# Patient Record
Sex: FEMALE | Race: WHITE | Employment: FULL TIME | URBAN - METROPOLITAN AREA
[De-identification: names, ages, dates, MRNs, and addresses within clinical notes are randomized per-mention and may not be internally consistent; named-entity substitution may affect disease eponyms.]

---

## 2017-03-20 ENCOUNTER — TELEPHONE (OUTPATIENT)
Dept: OBGYN CLINIC | Facility: CLINIC | Age: 32
End: 2017-03-20

## 2017-03-20 ENCOUNTER — APPOINTMENT (OUTPATIENT)
Dept: LAB | Age: 32
End: 2017-03-20
Attending: OBSTETRICS & GYNECOLOGY
Payer: COMMERCIAL

## 2017-03-20 DIAGNOSIS — N91.2 AMENORRHEA: Primary | ICD-10-CM

## 2017-03-20 DIAGNOSIS — N91.2 AMENORRHEA: ICD-10-CM

## 2017-03-20 LAB — HCG QUANTITATIVE: <1 MIU/ML (ref ?–1)

## 2017-03-20 PROCEDURE — 84702 CHORIONIC GONADOTROPIN TEST: CPT

## 2017-03-20 PROCEDURE — 36415 COLL VENOUS BLD VENIPUNCTURE: CPT

## 2017-03-20 NOTE — TELEPHONE ENCOUNTER
Emailed with Romelia Godoy last week. Still hasn't gotten her period. Is always very regular, will be 2 weeks late tomorrow. Was told to call the office today may need to have blood drawn. Took another pregnancy test on Saturday which was negative.

## 2017-03-20 NOTE — TELEPHONE ENCOUNTER
HCG ordered  Patient notified to schedule a NOB appointment if positive or a gyne visit if negative.   Please hold for HCG results

## 2017-03-21 NOTE — PROGRESS NOTES
Quick Note:    Please notify patient her HCG is negative, she is not pregnancy. Await menses in the next few weeks.   ______

## 2017-03-27 ENCOUNTER — APPOINTMENT (OUTPATIENT)
Dept: LAB | Age: 32
End: 2017-03-27
Attending: NURSE PRACTITIONER
Payer: COMMERCIAL

## 2017-03-27 ENCOUNTER — OFFICE VISIT (OUTPATIENT)
Dept: OBGYN CLINIC | Facility: CLINIC | Age: 32
End: 2017-03-27

## 2017-03-27 VITALS
HEIGHT: 66 IN | WEIGHT: 240 LBS | HEART RATE: 112 BPM | SYSTOLIC BLOOD PRESSURE: 140 MMHG | DIASTOLIC BLOOD PRESSURE: 100 MMHG | BODY MASS INDEX: 38.57 KG/M2

## 2017-03-27 DIAGNOSIS — N92.6 IRREGULAR MENSES: ICD-10-CM

## 2017-03-27 DIAGNOSIS — N92.6 IRREGULAR MENSES: Primary | ICD-10-CM

## 2017-03-27 LAB
CONTROL LINE PRESENT WITH A CLEAR BACKGROUND (YES/NO): YES YES/NO
FREE T4: 0.9 NG/DL (ref 0.9–1.8)
THYROXINE (T4): 9.7 UG/DL (ref 4.5–10.9)

## 2017-03-27 PROCEDURE — 84439 ASSAY OF FREE THYROXINE: CPT

## 2017-03-27 PROCEDURE — 81025 URINE PREGNANCY TEST: CPT | Performed by: NURSE PRACTITIONER

## 2017-03-27 PROCEDURE — 36415 COLL VENOUS BLD VENIPUNCTURE: CPT

## 2017-03-27 PROCEDURE — 99213 OFFICE O/P EST LOW 20 MIN: CPT | Performed by: NURSE PRACTITIONER

## 2017-03-27 RX ORDER — MEDROXYPROGESTERONE ACETATE 10 MG/1
10 TABLET ORAL DAILY
Qty: 5 TABLET | Refills: 0 | Status: SHIPPED | OUTPATIENT
Start: 2017-03-27 | End: 2017-04-01

## 2017-03-27 NOTE — PROGRESS NOTES
S:  Patient attempting conceptions since D/C OCP in 7/2016. Menses have been monthly until 2/7/17 LMP. Has taken multiple urine pregnancy tests all negative. Blood pregnancy test was negative. Having mild cramping, bloating currently.     O:  Vagina pink, m

## 2017-03-28 ENCOUNTER — TELEPHONE (OUTPATIENT)
Dept: OBGYN CLINIC | Facility: CLINIC | Age: 32
End: 2017-03-28

## 2017-03-28 RX ORDER — MEDROXYPROGESTERONE ACETATE 10 MG/1
TABLET ORAL
Qty: 90 TABLET | Refills: 0 | OUTPATIENT
Start: 2017-03-28

## 2017-05-26 ENCOUNTER — OFFICE VISIT (OUTPATIENT)
Dept: FAMILY MEDICINE CLINIC | Facility: CLINIC | Age: 32
End: 2017-05-26

## 2017-05-26 VITALS
SYSTOLIC BLOOD PRESSURE: 132 MMHG | HEART RATE: 108 BPM | BODY MASS INDEX: 39 KG/M2 | DIASTOLIC BLOOD PRESSURE: 84 MMHG | RESPIRATION RATE: 24 BRPM | TEMPERATURE: 98 F | WEIGHT: 243.63 LBS

## 2017-05-26 DIAGNOSIS — I10 ESSENTIAL HYPERTENSION: Primary | ICD-10-CM

## 2017-05-26 PROCEDURE — 99214 OFFICE O/P EST MOD 30 MIN: CPT | Performed by: FAMILY MEDICINE

## 2017-05-26 RX ORDER — METHYLDOPA 500 MG/1
500 TABLET, FILM COATED ORAL 2 TIMES DAILY
Qty: 60 TABLET | Refills: 0 | Status: SHIPPED | OUTPATIENT
Start: 2017-05-26 | End: 2017-07-30

## 2017-05-26 NOTE — PROGRESS NOTES
Haleigh Hatch is a 36year old female. Patient presents with:  Blood Pressure: per pt, follow up on BP      HPI:   A lot has happened since I saw her 6 months ago: Bought a new house. Still trying to get pregnant. Had a very stressful day today.  She 24  Wt 243 lb 9.6 oz  LMP 05/26/2017 Body mass index is 39.34 kg/(m^2).       GENERAL: well developed, well nourished,in no apparent distress  SKIN: no rashes,no suspicious lesions  HEENT: atraumatic, normocephalic,ears and throat are clear  NECK: supple,no

## 2017-05-27 RX ORDER — METHYLDOPA 500 MG/1
TABLET, FILM COATED ORAL
Qty: 180 TABLET | Refills: 0 | OUTPATIENT
Start: 2017-05-27

## 2017-06-09 ENCOUNTER — NURSE ONLY (OUTPATIENT)
Dept: FAMILY MEDICINE CLINIC | Facility: CLINIC | Age: 32
End: 2017-06-09

## 2017-06-09 VITALS — SYSTOLIC BLOOD PRESSURE: 122 MMHG | DIASTOLIC BLOOD PRESSURE: 88 MMHG

## 2017-06-09 NOTE — PROGRESS NOTES
Patient to clinic for BP check. Confirms she take Methyldopa 500 mg BID. Last took today at 0630. /96 left arm large cuff.     Patient states she just came her from work and she has \"white coat syndrome\"  Reports readings at home as follows:

## 2017-06-09 NOTE — PROGRESS NOTES
Lets leave her on the same dose and watch it for now. The top number is better, the bottom number is still a little high. Overall, however, it is better. Follow up with me in 3-4 months.

## 2017-06-09 NOTE — PROGRESS NOTES
Recheck /88    Advised office would call Monday with any further recommendations.   Patient states she will need refill sent to pharmacy

## 2017-06-29 ENCOUNTER — TELEPHONE (OUTPATIENT)
Dept: OBGYN CLINIC | Facility: CLINIC | Age: 32
End: 2017-06-29

## 2017-06-29 NOTE — TELEPHONE ENCOUNTER
Patient was just seen March 2017. She took Provera and got her period 2 weeks later. She has not had a menses since April. She feels as though she is going to get her menses but never does. She stopped her OCP one year ago.    Before April of this year

## 2017-06-29 NOTE — TELEPHONE ENCOUNTER
Pt called stating she has not had a period since April  Pregnancy tests negative  Saw NP in March for same problem - had 2 periods and not since

## 2017-07-08 ENCOUNTER — OFFICE VISIT (OUTPATIENT)
Dept: OBGYN CLINIC | Facility: CLINIC | Age: 32
End: 2017-07-08

## 2017-07-08 VITALS
WEIGHT: 244 LBS | SYSTOLIC BLOOD PRESSURE: 132 MMHG | HEART RATE: 107 BPM | HEIGHT: 67 IN | BODY MASS INDEX: 38.3 KG/M2 | DIASTOLIC BLOOD PRESSURE: 82 MMHG

## 2017-07-08 DIAGNOSIS — N92.6 IRREGULAR MENSES: ICD-10-CM

## 2017-07-08 DIAGNOSIS — Z32.02 URINE PREGNANCY TEST NEGATIVE: Primary | ICD-10-CM

## 2017-07-08 LAB
CONTROL LINE PRESENT WITH A CLEAR BACKGROUND (YES/NO): YES YES/NO
PREGNANCY TEST, URINE: NEGATIVE

## 2017-07-08 PROCEDURE — 81025 URINE PREGNANCY TEST: CPT | Performed by: NURSE PRACTITIONER

## 2017-07-08 PROCEDURE — 99213 OFFICE O/P EST LOW 20 MIN: CPT | Performed by: NURSE PRACTITIONER

## 2017-07-08 RX ORDER — MEDROXYPROGESTERONE ACETATE 10 MG/1
10 TABLET ORAL DAILY
Qty: 5 TABLET | Refills: 0 | Status: SHIPPED | OUTPATIENT
Start: 2017-07-08 | End: 2017-07-13

## 2017-07-08 RX ORDER — DROSPIRENONE AND ETHINYL ESTRADIOL 0.03MG-3MG
KIT ORAL
COMMUNITY
Start: 2014-02-14 | End: 2017-07-08

## 2017-07-08 NOTE — PROGRESS NOTES
Gyne note       S: patient is a 36year old yo  here for continued irregularities with menses. Normal monthly cycles after d/c OCP 2016. Desires conception. Last normal menses induced 2017 with Provera. Spouse present for visit.     Review of Sys multiples.

## 2017-07-10 RX ORDER — MEDROXYPROGESTERONE ACETATE 10 MG/1
TABLET ORAL
Qty: 90 TABLET | Refills: 0 | OUTPATIENT
Start: 2017-07-10

## 2017-07-20 ENCOUNTER — PATIENT MESSAGE (OUTPATIENT)
Dept: OBGYN CLINIC | Facility: CLINIC | Age: 32
End: 2017-07-20

## 2017-07-20 NOTE — TELEPHONE ENCOUNTER
From: Saad Crawford  To: YANIV Contreras  Sent: 7/20/2017 5:52 AM CDT  Subject: Non-Urgent Medical Question    Freda Briceño,    I am still waiting to get my period since stopping the provera. I've been off of it a week now.      If I were to get

## 2017-07-20 NOTE — TELEPHONE ENCOUNTER
Advise patient to call if no menses in the next week. If day 3 lands on a Sunday I do not believe lab is open at all so then I would defer labs to Monday.

## 2017-07-24 ENCOUNTER — APPOINTMENT (OUTPATIENT)
Dept: LAB | Facility: HOSPITAL | Age: 32
End: 2017-07-24
Attending: NURSE PRACTITIONER
Payer: COMMERCIAL

## 2017-07-24 DIAGNOSIS — N92.6 IRREGULAR MENSES: ICD-10-CM

## 2017-07-24 LAB
ESTRADIOL: 251.7 PG/ML
FSH: 4.3 MIU/ML
GLUCOSE BLD-MCNC: 94 MG/DL (ref 70–99)
INSULIN: 14.3 MU/L (ref 1.7–31)
LH: 1.3 MIU/ML
PROLACTIN: 10 NG/ML
TSI SER-ACNC: 2.16 MIU/ML (ref 0.35–5.5)

## 2017-07-24 PROCEDURE — 82947 ASSAY GLUCOSE BLOOD QUANT: CPT

## 2017-07-24 PROCEDURE — 84443 ASSAY THYROID STIM HORMONE: CPT

## 2017-07-24 PROCEDURE — 84403 ASSAY OF TOTAL TESTOSTERONE: CPT

## 2017-07-24 PROCEDURE — 84402 ASSAY OF FREE TESTOSTERONE: CPT

## 2017-07-24 PROCEDURE — 36415 COLL VENOUS BLD VENIPUNCTURE: CPT

## 2017-07-24 PROCEDURE — 83001 ASSAY OF GONADOTROPIN (FSH): CPT

## 2017-07-24 PROCEDURE — 83525 ASSAY OF INSULIN: CPT

## 2017-07-24 PROCEDURE — 82670 ASSAY OF TOTAL ESTRADIOL: CPT

## 2017-07-24 PROCEDURE — 83002 ASSAY OF GONADOTROPIN (LH): CPT

## 2017-07-24 PROCEDURE — 84146 ASSAY OF PROLACTIN: CPT

## 2017-07-26 LAB
SEX HORMONE BINDING GLOBULIN: 119 NMOL/L
TESTOSTERONE -MS, BIOAVAILAB: 4.3 NG/DL
TESTOSTERONE, -MS/MS: 22 NG/DL
TESTOSTERONE, FREE -MS/MS: 1.5 PG/ML

## 2017-07-27 ENCOUNTER — PATIENT MESSAGE (OUTPATIENT)
Dept: OBGYN CLINIC | Facility: CLINIC | Age: 32
End: 2017-07-27

## 2017-07-27 NOTE — TELEPHONE ENCOUNTER
Have patients spouse call his PCP for an order. He can go to any [facility] in the area or urology. Yes that would be the next step.

## 2017-07-27 NOTE — TELEPHONE ENCOUNTER
From: Mónica Toledo  To: YANIV Cai  Sent: 7/27/2017 3:38 PM CDT  Subject: Non-Urgent Medical Question    Stefan Mora,    At our appointment we discussed Kenney Joshua possibly having to have his sperm tested.  With my labs coming back normal, is t

## 2017-07-30 DIAGNOSIS — I10 ESSENTIAL HYPERTENSION: ICD-10-CM

## 2017-07-31 DIAGNOSIS — I10 ESSENTIAL HYPERTENSION: ICD-10-CM

## 2017-07-31 RX ORDER — METHYLDOPA 500 MG/1
TABLET, FILM COATED ORAL
Qty: 180 TABLET | Refills: 0 | Status: SHIPPED | OUTPATIENT
Start: 2017-07-31 | End: 2018-08-11

## 2017-07-31 RX ORDER — METHYLDOPA 500 MG/1
TABLET, FILM COATED ORAL
Qty: 60 TABLET | Refills: 0 | Status: SHIPPED | OUTPATIENT
Start: 2017-07-31 | End: 2017-07-31

## 2017-07-31 NOTE — TELEPHONE ENCOUNTER
Last OV 5/26/17 7/8/17 /82  Last refilled 5/26/17  #60  0 refills    Routed to Dr Alok Muñoz as covering provider

## 2017-08-01 ENCOUNTER — HOSPITAL ENCOUNTER (OUTPATIENT)
Dept: ULTRASOUND IMAGING | Age: 32
Discharge: HOME | End: 2017-08-01
Attending: NURSE PRACTITIONER
Payer: COMMERCIAL

## 2017-08-01 DIAGNOSIS — N92.6 IRREGULAR MENSES: ICD-10-CM

## 2017-08-01 PROCEDURE — 76856 US EXAM PELVIC COMPLETE: CPT | Performed by: NURSE PRACTITIONER

## 2017-08-01 PROCEDURE — 76830 TRANSVAGINAL US NON-OB: CPT | Performed by: NURSE PRACTITIONER

## 2017-08-02 DIAGNOSIS — N83.202 BILATERAL OVARIAN CYSTS: Primary | ICD-10-CM

## 2017-08-02 DIAGNOSIS — N83.201 BILATERAL OVARIAN CYSTS: Primary | ICD-10-CM

## 2017-11-20 ENCOUNTER — OFFICE VISIT (OUTPATIENT)
Dept: OBGYN CLINIC | Facility: CLINIC | Age: 32
End: 2017-11-20

## 2017-11-20 VITALS
HEART RATE: 106 BPM | DIASTOLIC BLOOD PRESSURE: 84 MMHG | BODY MASS INDEX: 40.17 KG/M2 | SYSTOLIC BLOOD PRESSURE: 136 MMHG | WEIGHT: 247 LBS | HEIGHT: 65.75 IN

## 2017-11-20 DIAGNOSIS — N91.2 AMENORRHEA: ICD-10-CM

## 2017-11-20 DIAGNOSIS — Z32.02 URINE PREGNANCY TEST NEGATIVE: Primary | ICD-10-CM

## 2017-11-20 DIAGNOSIS — Z12.4 CERVICAL CANCER SCREENING: ICD-10-CM

## 2017-11-20 DIAGNOSIS — Z01.419 WELL WOMAN EXAM WITH ROUTINE GYNECOLOGICAL EXAM: ICD-10-CM

## 2017-11-20 PROCEDURE — 87624 HPV HI-RISK TYP POOLED RSLT: CPT | Performed by: NURSE PRACTITIONER

## 2017-11-20 PROCEDURE — 88175 CYTOPATH C/V AUTO FLUID REDO: CPT | Performed by: NURSE PRACTITIONER

## 2017-11-20 PROCEDURE — 81025 URINE PREGNANCY TEST: CPT | Performed by: NURSE PRACTITIONER

## 2017-11-20 PROCEDURE — 99395 PREV VISIT EST AGE 18-39: CPT | Performed by: NURSE PRACTITIONER

## 2017-11-20 RX ORDER — MEDROXYPROGESTERONE ACETATE 10 MG/1
10 TABLET ORAL DAILY
Qty: 5 TABLET | Refills: 0 | Status: SHIPPED | OUTPATIENT
Start: 2017-11-20 | End: 2017-11-25

## 2017-11-20 NOTE — PROGRESS NOTES
Here for Routine Annual Exam  No menses since last one induced with Provera 7/2017. The one prior was also induced 4/2017. Desires pregnancy, has bilateral ovarian cysts. Denies any pain.   No C/O    ROS: No Cardiac, Respiratory, GI,  or Neurological symp

## 2017-11-26 DIAGNOSIS — I10 ESSENTIAL HYPERTENSION: ICD-10-CM

## 2017-11-27 DIAGNOSIS — I10 ESSENTIAL HYPERTENSION: ICD-10-CM

## 2017-11-27 RX ORDER — METHYLDOPA 500 MG/1
TABLET, FILM COATED ORAL
Qty: 180 TABLET | Refills: 0 | Status: SHIPPED | OUTPATIENT
Start: 2017-11-27 | End: 2017-12-15

## 2017-11-27 RX ORDER — METHYLDOPA 500 MG/1
TABLET, FILM COATED ORAL
Qty: 60 TABLET | Refills: 0 | Status: SHIPPED | OUTPATIENT
Start: 2017-11-27 | End: 2017-11-27

## 2017-11-27 NOTE — TELEPHONE ENCOUNTER
LOV: 5/26/17  Last Refill: 7/31/17  #180  0 refills    No future appointments.     Carol Lopez, 11/27/17, 11:39 AM

## 2017-12-15 ENCOUNTER — TELEPHONE (OUTPATIENT)
Dept: OBGYN CLINIC | Facility: CLINIC | Age: 32
End: 2017-12-15

## 2017-12-15 ENCOUNTER — OFFICE VISIT (OUTPATIENT)
Dept: FAMILY MEDICINE CLINIC | Facility: CLINIC | Age: 32
End: 2017-12-15

## 2017-12-15 VITALS
HEART RATE: 78 BPM | WEIGHT: 248 LBS | HEIGHT: 66 IN | DIASTOLIC BLOOD PRESSURE: 84 MMHG | SYSTOLIC BLOOD PRESSURE: 126 MMHG | TEMPERATURE: 99 F | BODY MASS INDEX: 39.86 KG/M2

## 2017-12-15 DIAGNOSIS — E28.2 PCOS (POLYCYSTIC OVARIAN SYNDROME): ICD-10-CM

## 2017-12-15 DIAGNOSIS — Z00.00 HEALTHY ADULT ON ROUTINE PHYSICAL EXAMINATION: Primary | ICD-10-CM

## 2017-12-15 DIAGNOSIS — Z00.00 PREVENTATIVE HEALTH CARE: ICD-10-CM

## 2017-12-15 PROCEDURE — 85025 COMPLETE CBC W/AUTO DIFF WBC: CPT | Performed by: FAMILY MEDICINE

## 2017-12-15 PROCEDURE — 80061 LIPID PANEL: CPT | Performed by: FAMILY MEDICINE

## 2017-12-15 PROCEDURE — 36415 COLL VENOUS BLD VENIPUNCTURE: CPT | Performed by: FAMILY MEDICINE

## 2017-12-15 PROCEDURE — 99395 PREV VISIT EST AGE 18-39: CPT | Performed by: FAMILY MEDICINE

## 2017-12-15 PROCEDURE — 80053 COMPREHEN METABOLIC PANEL: CPT | Performed by: FAMILY MEDICINE

## 2017-12-15 NOTE — PROGRESS NOTES
HPI:   William Casiano is a 29year old female who presents for a complete physical exam. Symptoms: periods are irregular . Patient complains of nothing new today. Being treated for PSOS, has a pelvic US this afternoon.  Considering clomid after that Grandmother    • Cancer Maternal Grandmother      colon   • Heart Disorder Maternal Grandmother    • Other [OTHER] Maternal Grandmother      copd/kidney disease   • Diabetes Paternal Grandfather    • Other Will Cra Sister      brain tumor   • Other Will Cra tenderness  LUNGS: clear to auscultation  CARDIO: RRR without murmur  GI: good BS's,no masses, HSM or tenderness  : deferred   MUSCULOSKELETAL: back is not tender,FROM of the back  EXTREMITIES: no cyanosis, clubbing or edema  NEURO: Oriented times three,

## 2017-12-18 NOTE — TELEPHONE ENCOUNTER
Spoke with patient. Reported results and instructed to f/u with MD in office to discuss plan of care. She states understanding and will call back to schedule appt.

## 2017-12-18 NOTE — TELEPHONE ENCOUNTER
Patients US received. Some changes to the ovaries are noted, unsure if cysts are new or just changed from the prior studies. Fibroid appears stable.  US is as follows:    Uterus 9.4 x 3.9 x 6.0 cm, 2.6 x 2.6 x 2.7 cm intramural fibroid within the left uteri

## 2017-12-19 ENCOUNTER — MED REC SCAN ONLY (OUTPATIENT)
Dept: OBGYN CLINIC | Facility: CLINIC | Age: 32
End: 2017-12-19

## 2018-01-09 ENCOUNTER — OFFICE VISIT (OUTPATIENT)
Dept: OBGYN CLINIC | Facility: CLINIC | Age: 33
End: 2018-01-09

## 2018-01-09 VITALS
SYSTOLIC BLOOD PRESSURE: 132 MMHG | WEIGHT: 250 LBS | HEIGHT: 66 IN | BODY MASS INDEX: 40.18 KG/M2 | DIASTOLIC BLOOD PRESSURE: 76 MMHG

## 2018-01-09 DIAGNOSIS — N97.9 FEMALE FERTILITY PROBLEM: Primary | ICD-10-CM

## 2018-01-09 PROCEDURE — 99212 OFFICE O/P EST SF 10 MIN: CPT | Performed by: OBSTETRICS & GYNECOLOGY

## 2018-01-09 NOTE — PROGRESS NOTES
[facility]  Obstetrics and Gynecology  Follow Up Progress Note    Subjective:     April Singh is a 29year old [place] female who was last seen in office on 11/20/17 with c/o fertility concerns.  The patient was recommended to return for fo

## 2018-01-09 NOTE — PATIENT INSTRUCTIONS
Please return in one year for your annual gyne visit or sooner if having abnormal vaginal bleeding or severe pelvic pain      Reproductive Endocrinology and Infertility Specialist ([facility]):    1) Dr. Catracho Griffith, IVF1   [facility] [address], [address] [address]

## 2018-01-10 PROBLEM — E28.2 PCOS (POLYCYSTIC OVARIAN SYNDROME): Status: RESOLVED | Noted: 2017-12-15 | Resolved: 2018-01-10

## 2018-01-10 PROBLEM — N97.9 FEMALE FERTILITY PROBLEM: Status: ACTIVE | Noted: 2018-01-10

## 2018-01-18 ENCOUNTER — MED REC SCAN ONLY (OUTPATIENT)
Dept: OBGYN CLINIC | Facility: CLINIC | Age: 33
End: 2018-01-18

## 2018-02-22 ENCOUNTER — PATIENT MESSAGE (OUTPATIENT)
Dept: OBGYN CLINIC | Facility: CLINIC | Age: 33
End: 2018-02-22

## 2018-02-22 NOTE — TELEPHONE ENCOUNTER
From: Jennifer November  To: YANIV Ortega  Sent: 2/22/2018 6:51 AM CST  Subject: Shady Deras,    I have my appointment with Dr. Gonzales Emery Saturday and need to obtain my test results, for blood work and ultrasounds, that we have done.

## 2018-03-07 DIAGNOSIS — I10 ESSENTIAL HYPERTENSION: ICD-10-CM

## 2018-03-07 NOTE — TELEPHONE ENCOUNTER
Last refilled on 11/27/17 for # 60 with 0 rf. Last seen on 12/15/17. /76 on 1/9/18. No future appointments. Thank you.

## 2018-03-08 RX ORDER — METHYLDOPA 500 MG/1
TABLET, FILM COATED ORAL
Qty: 180 TABLET | Refills: 0 | Status: SHIPPED | OUTPATIENT
Start: 2018-03-08 | End: 2018-08-10

## 2018-08-10 DIAGNOSIS — I10 ESSENTIAL HYPERTENSION: ICD-10-CM

## 2018-08-11 RX ORDER — METHYLDOPA 500 MG/1
TABLET, FILM COATED ORAL
Qty: 180 TABLET | Refills: 0 | Status: SHIPPED | OUTPATIENT
Start: 2018-08-11 | End: 2018-09-17

## 2018-08-11 NOTE — TELEPHONE ENCOUNTER
LOV: 12/15/17  Last Refill: 3/8/18 #180 0 RF    Future Appointments  Date Time Provider Eusebio Merida   9/17/2018 11:00 AM DO HALEIGH Kaur [facility]

## 2018-09-17 ENCOUNTER — OFFICE VISIT (OUTPATIENT)
Dept: FAMILY MEDICINE CLINIC | Facility: CLINIC | Age: 33
End: 2018-09-17
Payer: COMMERCIAL

## 2018-09-17 VITALS
SYSTOLIC BLOOD PRESSURE: 134 MMHG | HEART RATE: 83 BPM | OXYGEN SATURATION: 97 % | DIASTOLIC BLOOD PRESSURE: 86 MMHG | BODY MASS INDEX: 38.89 KG/M2 | WEIGHT: 242 LBS | HEIGHT: 66 IN

## 2018-09-17 DIAGNOSIS — N97.9 FEMALE FERTILITY PROBLEM: Primary | ICD-10-CM

## 2018-09-17 DIAGNOSIS — I10 ESSENTIAL HYPERTENSION: ICD-10-CM

## 2018-09-17 PROBLEM — E28.2 PCOS (POLYCYSTIC OVARIAN SYNDROME): Status: ACTIVE | Noted: 2018-02-17

## 2018-09-17 PROCEDURE — 99214 OFFICE O/P EST MOD 30 MIN: CPT | Performed by: FAMILY MEDICINE

## 2018-09-17 RX ORDER — METHYLDOPA 500 MG/1
500 TABLET, FILM COATED ORAL 3 TIMES DAILY
Qty: 90 TABLET | Refills: 2 | Status: SHIPPED | OUTPATIENT
Start: 2018-09-17 | End: 2019-01-10

## 2018-09-17 RX ORDER — METFORMIN HYDROCHLORIDE 500 MG/1
1000 TABLET, EXTENDED RELEASE ORAL 2 TIMES DAILY
Refills: 3 | COMMUNITY
Start: 2018-08-24 | End: 2019-02-13 | Stop reason: ALTCHOICE

## 2018-09-17 NOTE — PROGRESS NOTES
Halima Bah is a 29year old female. Patient presents with:  Blood Pressure      HPI:   Infertility: She is starting IVF in October. Start OCP today to hold her cycles, will start next IVF cycles. Switched jobs as well, still at [employer].   D arm, Patient Position: Sitting, Cuff Size: large)   Pulse 83   Ht 66\"   Wt 242 lb   LMP 09/16/2018   SpO2 97%   BMI 39.06 kg/m²  Body mass index is 39.06 kg/m².       GENERAL: well developed, well nourished,in no apparent distress  SKIN: no rashes,no suspi

## 2018-10-17 ENCOUNTER — NURSE ONLY (OUTPATIENT)
Dept: FAMILY MEDICINE CLINIC | Facility: CLINIC | Age: 33
End: 2018-10-17
Payer: COMMERCIAL

## 2018-10-17 VITALS — SYSTOLIC BLOOD PRESSURE: 120 MMHG | DIASTOLIC BLOOD PRESSURE: 88 MMHG

## 2018-10-17 DIAGNOSIS — Z01.812 PRE-OPERATIVE LABORATORY EXAMINATION: Primary | ICD-10-CM

## 2018-10-17 PROCEDURE — 93000 ELECTROCARDIOGRAM COMPLETE: CPT | Performed by: FAMILY MEDICINE

## 2018-10-17 NOTE — PROGRESS NOTES
Patient presents today for BP check and EKG ordered by Dr Ruddy Masterson. Patient brought orders with her. Entered into Epic. /88. EKG completed and checked by Dr Elif Patel.  Please fax EKG to Dr [facility] at [phone]  Patient left office in stable cond

## 2018-10-19 NOTE — PROGRESS NOTES
Pre-operative laboratory examination  (primary encounter diagnosis)    No orders of the defined types were placed in this encounter.       Meds & Refills for this Visit:  Requested Prescriptions      No prescriptions requested or ordered in this encounter

## 2018-12-13 ENCOUNTER — TELEPHONE (OUTPATIENT)
Dept: FAMILY MEDICINE CLINIC | Facility: CLINIC | Age: 33
End: 2018-12-13

## 2018-12-19 ENCOUNTER — TELEPHONE (OUTPATIENT)
Dept: OBGYN CLINIC | Facility: CLINIC | Age: 33
End: 2018-12-19

## 2018-12-19 NOTE — TELEPHONE ENCOUNTER
Patient called to schedule first appt.  She is currently [id number], IVF. Meds: Methyldopa, Metformin, Baby Aspirin, Progesterone, Estrogen, Fish oil. PMH: PCOS, HTN  PSH: Lap Band   Denies any concerns.  Patient wanted to be seen the week of

## 2018-12-19 NOTE — TELEPHONE ENCOUNTER
PT is established patient with [facility]  PT is currently with Dr. Sha Esquivel IVF    Patient calling to schedule NOB  LMP PT knows she is 5 wks and 2 days  Insurance [employer] PPO  Good time to return phone call anytime

## 2019-01-07 ENCOUNTER — TELEPHONE (OUTPATIENT)
Dept: OBGYN CLINIC | Facility: CLINIC | Age: 34
End: 2019-01-07

## 2019-01-07 NOTE — TELEPHONE ENCOUNTER
Received Dr. Rani Dolan IVF Records in [facility] 1615 in Dr. Alex rocha for 1/14/19 appt

## 2019-01-09 ENCOUNTER — TELEPHONE (OUTPATIENT)
Dept: OBGYN CLINIC | Facility: CLINIC | Age: 34
End: 2019-01-09

## 2019-01-09 NOTE — TELEPHONE ENCOUNTER
NOB appt scheduled for 1/14/19. Pt is transfer from [facility] after IVF. Last US done 1/2/19. Pt is very anxious and asking if an US will be done at her first appt.   Pt advised she is not scheduled for a separate US because the pregnancy has been con

## 2019-01-09 NOTE — TELEPHONE ENCOUNTER
PT has questions regarding US and if it will be done at her [address] [place] [address] appointment.      PT was not scheduled for confirmation of pregnancy

## 2019-01-10 DIAGNOSIS — I10 ESSENTIAL HYPERTENSION: ICD-10-CM

## 2019-01-11 RX ORDER — METHYLDOPA 500 MG/1
TABLET, FILM COATED ORAL
Qty: 270 TABLET | Refills: 1 | Status: SHIPPED | OUTPATIENT
Start: 2019-01-11 | End: 2019-02-13 | Stop reason: ALTCHOICE

## 2019-01-16 ENCOUNTER — LAB ENCOUNTER (OUTPATIENT)
Dept: LAB | Age: 34
End: 2019-01-16
Attending: OBSTETRICS & GYNECOLOGY
Payer: COMMERCIAL

## 2019-01-16 ENCOUNTER — INITIAL PRENATAL (OUTPATIENT)
Dept: OBGYN CLINIC | Facility: CLINIC | Age: 34
End: 2019-01-16
Payer: COMMERCIAL

## 2019-01-16 VITALS — DIASTOLIC BLOOD PRESSURE: 82 MMHG | BODY MASS INDEX: 39 KG/M2 | SYSTOLIC BLOOD PRESSURE: 116 MMHG | WEIGHT: 242 LBS

## 2019-01-16 DIAGNOSIS — I10 CHRONIC HYPERTENSION: ICD-10-CM

## 2019-01-16 DIAGNOSIS — O09.91 HIGH-RISK PREGNANCY IN FIRST TRIMESTER: ICD-10-CM

## 2019-01-16 LAB
ANTIBODY SCREEN: POSITIVE
BASOPHILS # BLD AUTO: 0.03 X10(3) UL (ref 0–0.1)
BASOPHILS NFR BLD AUTO: 0.4 %
DAT (C3D): NEGATIVE
DAT (IGG): POSITIVE
EOSINOPHIL # BLD AUTO: 0.15 X10(3) UL (ref 0–0.3)
EOSINOPHIL NFR BLD AUTO: 1.9 %
ERYTHROCYTE [DISTWIDTH] IN BLOOD BY AUTOMATED COUNT: 12.8 % (ref 11.5–16)
HBV SURFACE AG SER-ACNC: <0.1 [IU]/L
HBV SURFACE AG SERPL QL IA: NONREACTIVE
HCT VFR BLD AUTO: 37.9 % (ref 34–50)
HGB BLD-MCNC: 12.9 G/DL (ref 12–16)
IMMATURE GRANULOCYTE COUNT: 0.01 X10(3) UL (ref 0–1)
IMMATURE GRANULOCYTE RATIO %: 0.1 %
LYMPHOCYTES # BLD AUTO: 2.19 X10(3) UL (ref 0.9–4)
LYMPHOCYTES NFR BLD AUTO: 28 %
MCH RBC QN AUTO: 30.3 PG (ref 27–33.2)
MCHC RBC AUTO-ENTMCNC: 34 G/DL (ref 31–37)
MCV RBC AUTO: 89 FL (ref 81–100)
MONOCYTES # BLD AUTO: 0.47 X10(3) UL (ref 0.1–1)
MONOCYTES NFR BLD AUTO: 6 %
NEUTROPHIL ABS PRELIM: 4.98 X10 (3) UL (ref 1.3–6.7)
NEUTROPHILS # BLD AUTO: 4.98 X10(3) UL (ref 1.3–6.7)
NEUTROPHILS NFR BLD AUTO: 63.6 %
PLATELET # BLD AUTO: 260 10(3)UL (ref 150–450)
RBC # BLD AUTO: 4.26 X10(6)UL (ref 3.8–5.1)
RED CELL DISTRIBUTION WIDTH-SD: 41.8 FL (ref 35.1–46.3)
RH BLOOD TYPE: POSITIVE
RUBV IGG SER QL: POSITIVE
RUBV IGG SER-ACNC: >500 IU/ML (ref 10–?)
T PALLIDUM AB SER QL IA: NONREACTIVE
TREATSERUM: 2
WBC # BLD AUTO: 7.8 X10(3) UL (ref 4–13)

## 2019-01-16 PROCEDURE — 86077 PHYS BLOOD BANK SERV XMATCH: CPT | Performed by: OBSTETRICS & GYNECOLOGY

## 2019-01-16 PROCEDURE — 86880 COOMBS TEST DIRECT: CPT | Performed by: OBSTETRICS & GYNECOLOGY

## 2019-01-16 PROCEDURE — 87086 URINE CULTURE/COLONY COUNT: CPT | Performed by: OBSTETRICS & GYNECOLOGY

## 2019-01-16 PROCEDURE — 87340 HEPATITIS B SURFACE AG IA: CPT | Performed by: OBSTETRICS & GYNECOLOGY

## 2019-01-16 PROCEDURE — 86975 RBC SERUM PRETX INCUBJ DRUGS: CPT | Performed by: OBSTETRICS & GYNECOLOGY

## 2019-01-16 PROCEDURE — 86780 TREPONEMA PALLIDUM: CPT | Performed by: OBSTETRICS & GYNECOLOGY

## 2019-01-16 PROCEDURE — 85025 COMPLETE CBC W/AUTO DIFF WBC: CPT | Performed by: OBSTETRICS & GYNECOLOGY

## 2019-01-16 PROCEDURE — 86850 RBC ANTIBODY SCREEN: CPT | Performed by: OBSTETRICS & GYNECOLOGY

## 2019-01-16 PROCEDURE — 87389 HIV-1 AG W/HIV-1&-2 AB AG IA: CPT | Performed by: OBSTETRICS & GYNECOLOGY

## 2019-01-16 PROCEDURE — 86860 RBC ANTIBODY ELUTION: CPT | Performed by: OBSTETRICS & GYNECOLOGY

## 2019-01-16 PROCEDURE — 86900 BLOOD TYPING SEROLOGIC ABO: CPT | Performed by: OBSTETRICS & GYNECOLOGY

## 2019-01-16 PROCEDURE — 36415 COLL VENOUS BLD VENIPUNCTURE: CPT | Performed by: OBSTETRICS & GYNECOLOGY

## 2019-01-16 PROCEDURE — 86870 RBC ANTIBODY IDENTIFICATION: CPT | Performed by: OBSTETRICS & GYNECOLOGY

## 2019-01-16 PROCEDURE — 86901 BLOOD TYPING SEROLOGIC RH(D): CPT | Performed by: OBSTETRICS & GYNECOLOGY

## 2019-01-16 PROCEDURE — 86762 RUBELLA ANTIBODY: CPT | Performed by: OBSTETRICS & GYNECOLOGY

## 2019-01-16 RX ORDER — PROGESTERONE 90 MG/1.125G
GEL VAGINAL
Refills: 2 | COMMUNITY
Start: 2018-12-11 | End: 2019-02-13 | Stop reason: ALTCHOICE

## 2019-01-16 NOTE — PROGRESS NOTES
NOB  IVF Dr. Akbar Child  PMH: PCO, Hypertension(6 years)  PSH:Lap band 2012, lost 50 lbs  Meds: Metformin, MethylDopa (on back order0 She has enough till end of month  Prenatal care and course discussed with patient including ultrasounds, genetic testing optio

## 2019-01-28 ENCOUNTER — OFFICE VISIT (OUTPATIENT)
Dept: PERINATAL CARE | Facility: HOSPITAL | Age: 34
End: 2019-01-28
Attending: OBSTETRICS & GYNECOLOGY
Payer: COMMERCIAL

## 2019-01-28 ENCOUNTER — MED REC SCAN ONLY (OUTPATIENT)
Dept: OBGYN CLINIC | Facility: CLINIC | Age: 34
End: 2019-01-28

## 2019-01-28 VITALS
HEART RATE: 100 BPM | SYSTOLIC BLOOD PRESSURE: 136 MMHG | HEIGHT: 66 IN | DIASTOLIC BLOOD PRESSURE: 87 MMHG | WEIGHT: 242 LBS | BODY MASS INDEX: 38.89 KG/M2

## 2019-01-28 DIAGNOSIS — E28.2 PCOS (POLYCYSTIC OVARIAN SYNDROME): ICD-10-CM

## 2019-01-28 DIAGNOSIS — I10 CHRONIC HYPERTENSION: ICD-10-CM

## 2019-01-28 DIAGNOSIS — E66.01 MORBID OBESITY (HCC): ICD-10-CM

## 2019-01-28 DIAGNOSIS — O09.811 PREGNANCY RESULTING FROM IN VITRO FERTILIZATION IN FIRST TRIMESTER: ICD-10-CM

## 2019-01-28 PROCEDURE — 99243 OFF/OP CNSLTJ NEW/EST LOW 30: CPT | Performed by: OBSTETRICS & GYNECOLOGY

## 2019-01-28 RX ORDER — LABETALOL 200 MG/1
200 TABLET, FILM COATED ORAL 2 TIMES DAILY
Qty: 60 TABLET | Refills: 5 | Status: SHIPPED | OUTPATIENT
Start: 2019-01-28 | End: 2019-08-01

## 2019-02-13 ENCOUNTER — ROUTINE PRENATAL (OUTPATIENT)
Dept: OBGYN CLINIC | Facility: CLINIC | Age: 34
End: 2019-02-13
Payer: COMMERCIAL

## 2019-02-13 VITALS
HEIGHT: 66 IN | WEIGHT: 243 LBS | DIASTOLIC BLOOD PRESSURE: 90 MMHG | SYSTOLIC BLOOD PRESSURE: 130 MMHG | BODY MASS INDEX: 39.05 KG/M2

## 2019-02-13 DIAGNOSIS — O09.91 HIGH-RISK PREGNANCY IN FIRST TRIMESTER: Primary | ICD-10-CM

## 2019-02-13 DIAGNOSIS — I10 CHRONIC HYPERTENSION: ICD-10-CM

## 2019-02-13 NOTE — PROGRESS NOTES
STEVE.   Doing well. No complaints. Denies abdominal/pelvic pain or vaginal bleeding.    Rh + - Warm antibodies with weak titer - will repeat next visit    Genetic screening - declined   Level II US scheduled    RTC in 4 weeks

## 2019-03-11 ENCOUNTER — ROUTINE PRENATAL (OUTPATIENT)
Dept: OBGYN CLINIC | Facility: CLINIC | Age: 34
End: 2019-03-11
Payer: COMMERCIAL

## 2019-03-11 VITALS — BODY MASS INDEX: 40 KG/M2 | WEIGHT: 248 LBS | DIASTOLIC BLOOD PRESSURE: 78 MMHG | SYSTOLIC BLOOD PRESSURE: 124 MMHG

## 2019-03-11 DIAGNOSIS — Z34.92 SECOND TRIMESTER PREGNANCY: Primary | ICD-10-CM

## 2019-03-11 DIAGNOSIS — O16.2 HYPERTENSION AFFECTING PREGNANCY IN SECOND TRIMESTER: ICD-10-CM

## 2019-03-11 NOTE — PROGRESS NOTES
STEVE  Doing well  No complaints.  No LOF/VB/uctx  RH pos  Genetic testing declined, declined afp (first, quad/AFP)  Anatomy Scan level 2 scheduled(18-20weeks)    IVF pregnancy  CHTN on meds, delivery at 38 weeks  Early 1 hr gtt  Needs baseline PIH labs and 2

## 2019-03-16 ENCOUNTER — LAB ENCOUNTER (OUTPATIENT)
Dept: LAB | Age: 34
End: 2019-03-16
Attending: OBSTETRICS & GYNECOLOGY
Payer: COMMERCIAL

## 2019-03-16 DIAGNOSIS — Z34.92 SECOND TRIMESTER PREGNANCY: ICD-10-CM

## 2019-03-16 DIAGNOSIS — O16.2 HYPERTENSION AFFECTING PREGNANCY IN SECOND TRIMESTER: ICD-10-CM

## 2019-03-16 LAB
ALBUMIN SERPL-MCNC: 3 G/DL (ref 3.4–5)
ALBUMIN/GLOB SERPL: 0.7 {RATIO} (ref 1–2)
ALP LIVER SERPL-CCNC: 49 U/L (ref 37–98)
ALT SERPL-CCNC: 16 U/L (ref 13–56)
ANION GAP SERPL CALC-SCNC: 7 MMOL/L (ref 0–18)
ANTIBODY SCREEN: NEGATIVE
AST SERPL-CCNC: 12 U/L (ref 15–37)
BASOPHILS # BLD AUTO: 0.01 X10(3) UL (ref 0–0.2)
BASOPHILS NFR BLD AUTO: 0.1 %
BILIRUB SERPL-MCNC: 0.3 MG/DL (ref 0.1–2)
BUN BLD-MCNC: 5 MG/DL (ref 7–18)
BUN/CREAT SERPL: 7.1 (ref 10–20)
CALCIUM BLD-MCNC: 9.1 MG/DL (ref 8.5–10.1)
CHLORIDE SERPL-SCNC: 105 MMOL/L (ref 98–107)
CO2 SERPL-SCNC: 24 MMOL/L (ref 21–32)
CREAT BLD-MCNC: 0.7 MG/DL (ref 0.55–1.02)
DEPRECATED RDW RBC AUTO: 48.7 FL (ref 35.1–46.3)
EOSINOPHIL # BLD AUTO: 0.1 X10(3) UL (ref 0–0.7)
EOSINOPHIL NFR BLD AUTO: 1.2 %
ERYTHROCYTE [DISTWIDTH] IN BLOOD BY AUTOMATED COUNT: 14.3 % (ref 11–15)
GLOBULIN PLAS-MCNC: 4.1 G/DL (ref 2.8–4.4)
GLUCOSE 1H P GLC SERPL-MCNC: 150 MG/DL
GLUCOSE BLD-MCNC: 149 MG/DL (ref 70–99)
HCT VFR BLD AUTO: 37.9 % (ref 35–48)
HGB BLD-MCNC: 12.6 G/DL (ref 12–16)
IMM GRANULOCYTES # BLD AUTO: 0.02 X10(3) UL (ref 0–1)
IMM GRANULOCYTES NFR BLD: 0.2 %
LYMPHOCYTES # BLD AUTO: 1.6 X10(3) UL (ref 1–4)
LYMPHOCYTES NFR BLD AUTO: 19.2 %
M PROTEIN MFR SERPL ELPH: 7.1 G/DL (ref 6.4–8.2)
MCH RBC QN AUTO: 31.3 PG (ref 26–34)
MCHC RBC AUTO-ENTMCNC: 33.2 G/DL (ref 31–37)
MCV RBC AUTO: 94.3 FL (ref 80–100)
MONOCYTES # BLD AUTO: 0.36 X10(3) UL (ref 0.1–1)
MONOCYTES NFR BLD AUTO: 4.3 %
NEUTROPHILS # BLD AUTO: 6.23 X10 (3) UL (ref 1.5–7.7)
NEUTROPHILS # BLD AUTO: 6.23 X10(3) UL (ref 1.5–7.7)
NEUTROPHILS NFR BLD AUTO: 75 %
OSMOLALITY SERPL CALC.SUM OF ELEC: 282 MOSM/KG (ref 275–295)
PLATELET # BLD AUTO: 228 10(3)UL (ref 150–450)
POTASSIUM SERPL-SCNC: 3.9 MMOL/L (ref 3.5–5.1)
RBC # BLD AUTO: 4.02 X10(6)UL (ref 3.8–5.3)
SODIUM SERPL-SCNC: 136 MMOL/L (ref 136–145)
URATE SERPL-MCNC: 4.4 MG/DL (ref 2.6–6)
WBC # BLD AUTO: 8.3 X10(3) UL (ref 4–11)

## 2019-03-16 PROCEDURE — 85025 COMPLETE CBC W/AUTO DIFF WBC: CPT | Performed by: OBSTETRICS & GYNECOLOGY

## 2019-03-16 PROCEDURE — 36415 COLL VENOUS BLD VENIPUNCTURE: CPT | Performed by: OBSTETRICS & GYNECOLOGY

## 2019-03-16 PROCEDURE — 86850 RBC ANTIBODY SCREEN: CPT | Performed by: OBSTETRICS & GYNECOLOGY

## 2019-03-16 PROCEDURE — 82950 GLUCOSE TEST: CPT | Performed by: OBSTETRICS & GYNECOLOGY

## 2019-03-16 PROCEDURE — 84550 ASSAY OF BLOOD/URIC ACID: CPT | Performed by: OBSTETRICS & GYNECOLOGY

## 2019-03-16 PROCEDURE — 80053 COMPREHEN METABOLIC PANEL: CPT | Performed by: OBSTETRICS & GYNECOLOGY

## 2019-03-18 ENCOUNTER — APPOINTMENT (OUTPATIENT)
Dept: LAB | Facility: HOSPITAL | Age: 34
End: 2019-03-18
Attending: OBSTETRICS & GYNECOLOGY
Payer: COMMERCIAL

## 2019-03-18 LAB
M PROTEIN 24H UR ELPH-MRATE: 147 MG/24 HR (ref ?–149.1)
SPECIMEN VOL UR: 1500 ML

## 2019-03-18 PROCEDURE — 84156 ASSAY OF PROTEIN URINE: CPT | Performed by: OBSTETRICS & GYNECOLOGY

## 2019-03-19 NOTE — PROGRESS NOTES
Patient informed of results. Verbalized understanding. Can you please confirm that patient should do 3 hour gtt now at 18 weeks? Thanks.

## 2019-03-30 ENCOUNTER — LAB ENCOUNTER (OUTPATIENT)
Dept: LAB | Age: 34
End: 2019-03-30
Attending: OBSTETRICS & GYNECOLOGY
Payer: COMMERCIAL

## 2019-03-30 DIAGNOSIS — R73.09 ABNORMAL GTT (GLUCOSE TOLERANCE TEST): ICD-10-CM

## 2019-03-30 PROCEDURE — 36415 COLL VENOUS BLD VENIPUNCTURE: CPT

## 2019-03-30 PROCEDURE — 82952 GTT-ADDED SAMPLES: CPT

## 2019-03-30 PROCEDURE — 82962 GLUCOSE BLOOD TEST: CPT

## 2019-03-30 PROCEDURE — 82951 GLUCOSE TOLERANCE TEST (GTT): CPT

## 2019-04-01 ENCOUNTER — OFFICE VISIT (OUTPATIENT)
Dept: PERINATAL CARE | Facility: HOSPITAL | Age: 34
End: 2019-04-01
Attending: OBSTETRICS & GYNECOLOGY
Payer: COMMERCIAL

## 2019-04-01 VITALS
WEIGHT: 247 LBS | BODY MASS INDEX: 39.7 KG/M2 | HEART RATE: 97 BPM | SYSTOLIC BLOOD PRESSURE: 141 MMHG | HEIGHT: 66 IN | DIASTOLIC BLOOD PRESSURE: 94 MMHG

## 2019-04-01 DIAGNOSIS — O09.812 PREGNANCY RESULTING FROM IN VITRO FERTILIZATION IN SECOND TRIMESTER: ICD-10-CM

## 2019-04-01 DIAGNOSIS — E66.01 MORBID OBESITY (HCC): ICD-10-CM

## 2019-04-01 DIAGNOSIS — I10 CHRONIC HYPERTENSION: ICD-10-CM

## 2019-04-01 DIAGNOSIS — E28.2 PCOS (POLYCYSTIC OVARIAN SYNDROME): ICD-10-CM

## 2019-04-01 PROCEDURE — 99214 OFFICE O/P EST MOD 30 MIN: CPT | Performed by: OBSTETRICS & GYNECOLOGY

## 2019-04-01 PROCEDURE — 76811 OB US DETAILED SNGL FETUS: CPT | Performed by: OBSTETRICS & GYNECOLOGY

## 2019-04-01 NOTE — PROGRESS NOTES
Indication: CHTN.   ____________________________________________________________________________  History: Age: 34 years.  : 1 Para: 0.  ____________________________________________________________________________  Dating:  LMP: 11/10/2018 EDC: 08/ normal.    ____________________________________________________________________________  Maternal Structures:  Cervical length 36.1 mm.  ____________________________________________________________________________  Comments:      Reason for Consult:  Dear that a better diet, weight loss and routine exercise can help her blood pressure for the rest of her life. We discussed the morbidity associated with hypertension including heart disease, strokes and kidney disease.             Conception by IVF is associa participate in the care of your patient. Please do not hesitate to call with any questions or concerns. Total patient time was 25 minutes in evaluation, consultation, and coordination of care.   Greater than 50% of this time was spent in face to face d

## 2019-04-08 ENCOUNTER — ROUTINE PRENATAL (OUTPATIENT)
Dept: OBGYN CLINIC | Facility: CLINIC | Age: 34
End: 2019-04-08
Payer: COMMERCIAL

## 2019-04-08 VITALS — WEIGHT: 250 LBS | DIASTOLIC BLOOD PRESSURE: 78 MMHG | BODY MASS INDEX: 40 KG/M2 | SYSTOLIC BLOOD PRESSURE: 136 MMHG

## 2019-04-08 DIAGNOSIS — I10 CHRONIC HYPERTENSION: ICD-10-CM

## 2019-04-08 DIAGNOSIS — O09.91 HIGH-RISK PREGNANCY IN FIRST TRIMESTER: Primary | ICD-10-CM

## 2019-04-08 NOTE — PROGRESS NOTES
No C/O, has FM  Echo scheduled  Needs second GTT (ordered)  BP doing well on Labetalol  CPM  Delivery by 38 weeks, NST's at 32 weeks  4 weeks

## 2019-04-18 ENCOUNTER — TELEPHONE (OUTPATIENT)
Dept: OBGYN CLINIC | Facility: CLINIC | Age: 34
End: 2019-04-18

## 2019-04-18 NOTE — TELEPHONE ENCOUNTER
Received attending provider statement from the [address] [place] [address]. Please complete and fax. No charge for the one page.      Placed in nursing bin at [facility]

## 2019-04-24 ENCOUNTER — MED REC SCAN ONLY (OUTPATIENT)
Dept: OBGYN CLINIC | Facility: CLINIC | Age: 34
End: 2019-04-24

## 2019-04-29 ENCOUNTER — OFFICE VISIT (OUTPATIENT)
Dept: PERINATAL CARE | Facility: HOSPITAL | Age: 34
End: 2019-04-29
Attending: OBSTETRICS & GYNECOLOGY
Payer: COMMERCIAL

## 2019-04-29 VITALS
WEIGHT: 250 LBS | HEART RATE: 118 BPM | DIASTOLIC BLOOD PRESSURE: 88 MMHG | BODY MASS INDEX: 40 KG/M2 | SYSTOLIC BLOOD PRESSURE: 135 MMHG

## 2019-04-29 DIAGNOSIS — O09.812 PREGNANCY RESULTING FROM IN VITRO FERTILIZATION IN SECOND TRIMESTER: ICD-10-CM

## 2019-04-29 DIAGNOSIS — E28.2 PCOS (POLYCYSTIC OVARIAN SYNDROME): ICD-10-CM

## 2019-04-29 DIAGNOSIS — I10 CHRONIC HYPERTENSION: ICD-10-CM

## 2019-04-29 DIAGNOSIS — E66.01 MORBID OBESITY (HCC): ICD-10-CM

## 2019-04-29 PROCEDURE — 93325 DOPPLER ECHO COLOR FLOW MAPG: CPT | Performed by: OBSTETRICS & GYNECOLOGY

## 2019-04-29 PROCEDURE — 76825 ECHO EXAM OF FETAL HEART: CPT | Performed by: OBSTETRICS & GYNECOLOGY

## 2019-04-29 PROCEDURE — 76827 ECHO EXAM OF FETAL HEART: CPT | Performed by: OBSTETRICS & GYNECOLOGY

## 2019-04-29 PROCEDURE — 99214 OFFICE O/P EST MOD 30 MIN: CPT | Performed by: OBSTETRICS & GYNECOLOGY

## 2019-04-29 NOTE — PROGRESS NOTES
Indication: IVF, CHTN.   ____________________________________________________________________________  History: Age: 34 years. : 1 Para: 0.  Last menstrual period: 11/10/2018.  _______________________________________________________________________ Known Allergies  Current Meds:   Current Outpatient Medications: Labetalol HCl 200 MG Oral Tab Take 1 tablet (200 mg total) by mouth 2 (two) times daily. Disp: 60 tablet Rfl: 5  aspirin 81 MG Oral Tab Take 81 mg by mouth daily. Disp:  Rfl:   PRENATAL 27-0. % so fetal echocardiography is recommended in all IVF patients. Stillbirth and  mortality rates appear to be increased as much as four-fold.        Amparo Bohr ART pregnancies, the relative risk of common pregnancy complications such as fetal growth

## 2019-05-06 ENCOUNTER — ROUTINE PRENATAL (OUTPATIENT)
Dept: OBGYN CLINIC | Facility: CLINIC | Age: 34
End: 2019-05-06
Payer: COMMERCIAL

## 2019-05-06 VITALS — BODY MASS INDEX: 41 KG/M2 | SYSTOLIC BLOOD PRESSURE: 130 MMHG | WEIGHT: 252 LBS | DIASTOLIC BLOOD PRESSURE: 78 MMHG

## 2019-05-06 DIAGNOSIS — O16.2 HYPERTENSION AFFECTING PREGNANCY IN SECOND TRIMESTER: ICD-10-CM

## 2019-05-06 DIAGNOSIS — O09.91 HIGH-RISK PREGNANCY IN FIRST TRIMESTER: Primary | ICD-10-CM

## 2019-05-06 NOTE — PROGRESS NOTES
STEVE  Doing well  FM good  RH positive  Normal fetal echo with MFM. - Weekly NST 32 weeks, monthly growth US in third trimester, Labetalol 200 mg BID, deliver at 38 weeks, continue daily BASA. Failed early 1 hour GTT, passed early 3 hour GTT.  Has another 3

## 2019-05-22 NOTE — PROGRESS NOTES
Smith Lozoya  Dear Dr. Simmons Kingston,     Thank you for requesting ultrasound evaluation and maternal fetal medicine consultation on your patient Bandar Shannon.   As you are aware she is a 34year old female  with a Singleto Ratio 1.118  65th%  FL/AC Ratio 0.227  n/a  BPD/FL Ratio 1.332  19th%  HC/FL Ratio 4.936  43rd%  EFW (lbs/oz) 2 lbs 9 ozs  EFW (g) 1157 g  42nd%     Fetal heart activity: present. Fetal heart rate: 151 bpm.   Fetal presentation: cephalic.    Amniotic fluid: 115-120/70's.       Medical Regimen Recommendation: no change and continue low-dose ASA.     Continued medication use and home blood pressure assessments were reviewed as well as recommendations for serial growth ultrasounds and antepartum testing.     Kyung

## 2019-05-25 ENCOUNTER — LAB ENCOUNTER (OUTPATIENT)
Dept: LAB | Age: 34
End: 2019-05-25
Attending: OBSTETRICS & GYNECOLOGY
Payer: COMMERCIAL

## 2019-05-25 DIAGNOSIS — O09.91 HIGH-RISK PREGNANCY IN FIRST TRIMESTER: ICD-10-CM

## 2019-05-25 PROCEDURE — 36415 COLL VENOUS BLD VENIPUNCTURE: CPT

## 2019-05-25 PROCEDURE — 82952 GTT-ADDED SAMPLES: CPT

## 2019-05-25 PROCEDURE — 82962 GLUCOSE BLOOD TEST: CPT

## 2019-05-25 PROCEDURE — 82951 GLUCOSE TOLERANCE TEST (GTT): CPT

## 2019-05-29 ENCOUNTER — OFFICE VISIT (OUTPATIENT)
Dept: PERINATAL CARE | Facility: HOSPITAL | Age: 34
End: 2019-05-29
Attending: OBSTETRICS & GYNECOLOGY
Payer: COMMERCIAL

## 2019-05-29 VITALS
HEART RATE: 105 BPM | WEIGHT: 252 LBS | SYSTOLIC BLOOD PRESSURE: 132 MMHG | BODY MASS INDEX: 41 KG/M2 | DIASTOLIC BLOOD PRESSURE: 85 MMHG

## 2019-05-29 DIAGNOSIS — I10 CHRONIC HYPERTENSION: ICD-10-CM

## 2019-05-29 DIAGNOSIS — O09.813 PREGNANCY RESULTING FROM ASSISTED REPRODUCTIVE TECHNOLOGY IN THIRD TRIMESTER: ICD-10-CM

## 2019-05-29 DIAGNOSIS — O09.613 HIGH-RISK PREGNANCY, YOUNG PRIMIGRAVIDA IN THIRD TRIMESTER: ICD-10-CM

## 2019-05-29 DIAGNOSIS — E66.01 MORBID OBESITY (HCC): ICD-10-CM

## 2019-05-29 PROCEDURE — 99214 OFFICE O/P EST MOD 30 MIN: CPT | Performed by: OBSTETRICS & GYNECOLOGY

## 2019-05-29 PROCEDURE — 76816 OB US FOLLOW-UP PER FETUS: CPT | Performed by: OBSTETRICS & GYNECOLOGY

## 2019-05-30 ENCOUNTER — ROUTINE PRENATAL (OUTPATIENT)
Dept: OBGYN CLINIC | Facility: CLINIC | Age: 34
End: 2019-05-30
Payer: COMMERCIAL

## 2019-05-30 VITALS — WEIGHT: 256.81 LBS | SYSTOLIC BLOOD PRESSURE: 118 MMHG | BODY MASS INDEX: 41 KG/M2 | DIASTOLIC BLOOD PRESSURE: 76 MMHG

## 2019-05-30 DIAGNOSIS — Z23 NEED FOR VACCINATION: ICD-10-CM

## 2019-05-30 DIAGNOSIS — O09.93 HIGH-RISK PREGNANCY IN THIRD TRIMESTER: Primary | ICD-10-CM

## 2019-05-30 DIAGNOSIS — O16.3 HYPERTENSION AFFECTING PREGNANCY IN THIRD TRIMESTER: ICD-10-CM

## 2019-05-30 PROCEDURE — 90471 IMMUNIZATION ADMIN: CPT | Performed by: NURSE PRACTITIONER

## 2019-05-30 PROCEDURE — 90715 TDAP VACCINE 7 YRS/> IM: CPT | Performed by: NURSE PRACTITIONER

## 2019-05-30 NOTE — PROGRESS NOTES
STEVE  Doing well,  GOOD FM  Denies VB/LOF/uctx  Saw MFM- Monthly growth US, NST weekly 32 weeks, deliver at 38 weeks  TDAP received  HIV ordered  RTC in 2 wks  Fetal movement discussed

## 2019-06-10 ENCOUNTER — ROUTINE PRENATAL (OUTPATIENT)
Dept: OBGYN CLINIC | Facility: CLINIC | Age: 34
End: 2019-06-10
Payer: COMMERCIAL

## 2019-06-10 VITALS — SYSTOLIC BLOOD PRESSURE: 126 MMHG | BODY MASS INDEX: 42 KG/M2 | DIASTOLIC BLOOD PRESSURE: 82 MMHG | WEIGHT: 259 LBS

## 2019-06-10 DIAGNOSIS — O16.3 HYPERTENSION AFFECTING PREGNANCY IN THIRD TRIMESTER: ICD-10-CM

## 2019-06-10 DIAGNOSIS — O09.93 HIGH-RISK PREGNANCY IN THIRD TRIMESTER: Primary | ICD-10-CM

## 2019-06-10 PROCEDURE — 87389 HIV-1 AG W/HIV-1&-2 AB AG IA: CPT | Performed by: NURSE PRACTITIONER

## 2019-06-10 NOTE — PROGRESS NOTES
STEVE  Doing well,  GOOD FM  Denies VB/LOF/uctx  HIV done  Has f/u US with MFM scheduled  RTC in 2 wks with NST  Fetal movement discussed

## 2019-06-24 ENCOUNTER — APPOINTMENT (OUTPATIENT)
Dept: OBGYN CLINIC | Facility: CLINIC | Age: 34
End: 2019-06-24
Payer: COMMERCIAL

## 2019-06-24 ENCOUNTER — ROUTINE PRENATAL (OUTPATIENT)
Dept: OBGYN CLINIC | Facility: CLINIC | Age: 34
End: 2019-06-24
Payer: COMMERCIAL

## 2019-06-24 VITALS — SYSTOLIC BLOOD PRESSURE: 118 MMHG | DIASTOLIC BLOOD PRESSURE: 76 MMHG | BODY MASS INDEX: 42 KG/M2 | WEIGHT: 261 LBS

## 2019-06-24 DIAGNOSIS — O16.3 HYPERTENSION AFFECTING PREGNANCY IN THIRD TRIMESTER: ICD-10-CM

## 2019-06-24 DIAGNOSIS — O09.93 HIGH-RISK PREGNANCY IN THIRD TRIMESTER: Primary | ICD-10-CM

## 2019-06-24 PROCEDURE — 59025 FETAL NON-STRESS TEST: CPT | Performed by: OBSTETRICS & GYNECOLOGY

## 2019-06-24 NOTE — PROGRESS NOTES
Ansley Teixeira  Dear Dr. Cooper Mendoza,     Thank you for requesting ultrasound evaluation and maternal fetal medicine consultation on your patient Devyn Block.  As you are aware she is a 34year Janace Earing a Singleto Ratio 1.017  28th%  FL/AC Ratio 0.204  n/a  BPD/FL Ratio 1.352  31st%  HC/FL Ratio 5.005  65th%  EFW (lbs/oz) 4 lbs 5 ozs  EFW (g) 1946 g  42nd%     Fetal heart activity: present. Fetal heart rate: 135 bpm.   Fetal presentation: cephalic.    Cord: normal. low-dose ASA.     Continued medication use and home blood pressure assessments were reviewed as well as recommendations for serial growth ultrasounds and antepartum testing.     Assisted Reproductive technology -   Conception by IVF is associated with an i

## 2019-06-26 ENCOUNTER — OFFICE VISIT (OUTPATIENT)
Dept: PERINATAL CARE | Facility: HOSPITAL | Age: 34
End: 2019-06-26
Attending: OBSTETRICS & GYNECOLOGY
Payer: COMMERCIAL

## 2019-06-26 VITALS
HEART RATE: 103 BPM | DIASTOLIC BLOOD PRESSURE: 85 MMHG | BODY MASS INDEX: 42 KG/M2 | SYSTOLIC BLOOD PRESSURE: 123 MMHG | WEIGHT: 261 LBS

## 2019-06-26 DIAGNOSIS — E66.01 MORBID OBESITY (HCC): ICD-10-CM

## 2019-06-26 DIAGNOSIS — O16.3 HYPERTENSION AFFECTING PREGNANCY IN THIRD TRIMESTER: ICD-10-CM

## 2019-06-26 DIAGNOSIS — O09.813 PREGNANCY RESULTING FROM ASSISTED REPRODUCTIVE TECHNOLOGY IN THIRD TRIMESTER: ICD-10-CM

## 2019-06-26 DIAGNOSIS — I10 ESSENTIAL HYPERTENSION: ICD-10-CM

## 2019-06-26 PROCEDURE — 76819 FETAL BIOPHYS PROFIL W/O NST: CPT | Performed by: OBSTETRICS & GYNECOLOGY

## 2019-06-26 PROCEDURE — 99213 OFFICE O/P EST LOW 20 MIN: CPT | Performed by: OBSTETRICS & GYNECOLOGY

## 2019-06-26 PROCEDURE — 76816 OB US FOLLOW-UP PER FETUS: CPT | Performed by: OBSTETRICS & GYNECOLOGY

## 2019-06-27 ENCOUNTER — TELEPHONE (OUTPATIENT)
Dept: OBGYN CLINIC | Facility: CLINIC | Age: 34
End: 2019-06-27

## 2019-07-01 ENCOUNTER — APPOINTMENT (OUTPATIENT)
Dept: OBGYN CLINIC | Facility: CLINIC | Age: 34
End: 2019-07-01
Payer: COMMERCIAL

## 2019-07-01 ENCOUNTER — ROUTINE PRENATAL (OUTPATIENT)
Dept: OBGYN CLINIC | Facility: CLINIC | Age: 34
End: 2019-07-01
Payer: COMMERCIAL

## 2019-07-01 VITALS — WEIGHT: 264.63 LBS | DIASTOLIC BLOOD PRESSURE: 70 MMHG | SYSTOLIC BLOOD PRESSURE: 120 MMHG | BODY MASS INDEX: 43 KG/M2

## 2019-07-01 DIAGNOSIS — O16.3 HYPERTENSION AFFECTING PREGNANCY IN THIRD TRIMESTER: ICD-10-CM

## 2019-07-01 DIAGNOSIS — O09.93 HIGH-RISK PREGNANCY IN THIRD TRIMESTER: Primary | ICD-10-CM

## 2019-07-01 PROCEDURE — 59025 FETAL NON-STRESS TEST: CPT | Performed by: OBSTETRICS & GYNECOLOGY

## 2019-07-08 ENCOUNTER — APPOINTMENT (OUTPATIENT)
Dept: OBGYN CLINIC | Facility: CLINIC | Age: 34
End: 2019-07-08
Payer: COMMERCIAL

## 2019-07-08 ENCOUNTER — ROUTINE PRENATAL (OUTPATIENT)
Dept: OBGYN CLINIC | Facility: CLINIC | Age: 34
End: 2019-07-08
Payer: COMMERCIAL

## 2019-07-08 VITALS — SYSTOLIC BLOOD PRESSURE: 142 MMHG | DIASTOLIC BLOOD PRESSURE: 90 MMHG | WEIGHT: 263 LBS | BODY MASS INDEX: 42 KG/M2

## 2019-07-08 DIAGNOSIS — I10 ESSENTIAL HYPERTENSION: ICD-10-CM

## 2019-07-08 DIAGNOSIS — O16.3 HYPERTENSION AFFECTING PREGNANCY IN THIRD TRIMESTER: Primary | ICD-10-CM

## 2019-07-08 PROBLEM — O09.93 HIGH-RISK PREGNANCY IN THIRD TRIMESTER: Status: ACTIVE | Noted: 2019-01-16

## 2019-07-08 PROCEDURE — 59025 FETAL NON-STRESS TEST: CPT | Performed by: OBSTETRICS & GYNECOLOGY

## 2019-07-08 NOTE — PROGRESS NOTES
STEVE  Doing well, +FM  Denies LOF/VB/uctx  Has not taken her Labetalol today. No PIH symptoms.  Bps at home all normal  RTC 1 week    NST reactive and reassuring

## 2019-07-15 ENCOUNTER — APPOINTMENT (OUTPATIENT)
Dept: OBGYN CLINIC | Facility: CLINIC | Age: 34
End: 2019-07-15
Payer: COMMERCIAL

## 2019-07-15 ENCOUNTER — ROUTINE PRENATAL (OUTPATIENT)
Dept: OBGYN CLINIC | Facility: CLINIC | Age: 34
End: 2019-07-15
Payer: COMMERCIAL

## 2019-07-15 VITALS — DIASTOLIC BLOOD PRESSURE: 78 MMHG | SYSTOLIC BLOOD PRESSURE: 140 MMHG | WEIGHT: 267 LBS | BODY MASS INDEX: 43 KG/M2

## 2019-07-15 DIAGNOSIS — O10.919 CHRONIC HYPERTENSION AFFECTING PREGNANCY: Primary | ICD-10-CM

## 2019-07-15 PROCEDURE — 59025 FETAL NON-STRESS TEST: CPT | Performed by: OBSTETRICS & GYNECOLOGY

## 2019-07-15 NOTE — PROGRESS NOTES
STEVE  Doing well, +FM  Denies VB/LOF/uctx  Mode of delivery:   anticipated  SVE deferred   GBS next visit  RTC 1 week  NST for [facility], IVF pregnancy, reactive

## 2019-07-18 ENCOUNTER — EMPLOYEE HEALTH (OUTPATIENT)
Dept: OTHER | Facility: HOSPITAL | Age: 34
End: 2019-07-18
Attending: PREVENTIVE MEDICINE

## 2019-07-18 DIAGNOSIS — A15.0 TB (PULMONARY TUBERCULOSIS): Primary | ICD-10-CM

## 2019-07-18 PROCEDURE — 86480 TB TEST CELL IMMUN MEASURE: CPT

## 2019-07-20 ENCOUNTER — HOSPITAL ENCOUNTER (INPATIENT)
Facility: HOSPITAL | Age: 34
LOS: 3 days | Discharge: HOME | End: 2019-07-23
Attending: OBSTETRICS & GYNECOLOGY | Admitting: OBSTETRICS & GYNECOLOGY
Payer: COMMERCIAL

## 2019-07-20 PROBLEM — Z34.90 PREGNANCY: Status: ACTIVE | Noted: 2019-07-20

## 2019-07-20 LAB
DEPRECATED RDW RBC AUTO: 47.9 FL (ref 35.1–46.3)
ERYTHROCYTE [DISTWIDTH] IN BLOOD BY AUTOMATED COUNT: 14.6 % (ref 11–15)
HCT VFR BLD AUTO: 35.8 % (ref 35–48)
HGB BLD-MCNC: 11.8 G/DL (ref 12–16)
MCH RBC QN AUTO: 29.7 PG (ref 26–34)
MCHC RBC AUTO-ENTMCNC: 33 G/DL (ref 31–37)
MCV RBC AUTO: 90.2 FL (ref 80–100)
PLATELET # BLD AUTO: 257 10(3)UL (ref 150–450)
RBC # BLD AUTO: 3.97 X10(6)UL (ref 3.8–5.3)
WBC # BLD AUTO: 8.1 X10(3) UL (ref 4–11)

## 2019-07-20 RX ORDER — IBUPROFEN 600 MG/1
600 TABLET ORAL ONCE AS NEEDED
Status: DISCONTINUED | OUTPATIENT
Start: 2019-07-20 | End: 2019-07-21

## 2019-07-20 RX ORDER — TRISODIUM CITRATE DIHYDRATE AND CITRIC ACID MONOHYDRATE 500; 334 MG/5ML; MG/5ML
30 SOLUTION ORAL AS NEEDED
Status: DISCONTINUED | OUTPATIENT
Start: 2019-07-20 | End: 2019-07-21

## 2019-07-20 RX ORDER — DEXTROSE, SODIUM CHLORIDE, SODIUM LACTATE, POTASSIUM CHLORIDE, AND CALCIUM CHLORIDE 5; .6; .31; .03; .02 G/100ML; G/100ML; G/100ML; G/100ML; G/100ML
INJECTION, SOLUTION INTRAVENOUS AS NEEDED
Status: DISCONTINUED | OUTPATIENT
Start: 2019-07-20 | End: 2019-07-21

## 2019-07-20 RX ORDER — SODIUM CHLORIDE, SODIUM LACTATE, POTASSIUM CHLORIDE, CALCIUM CHLORIDE 600; 310; 30; 20 MG/100ML; MG/100ML; MG/100ML; MG/100ML
INJECTION, SOLUTION INTRAVENOUS CONTINUOUS
Status: DISCONTINUED | OUTPATIENT
Start: 2019-07-20 | End: 2019-07-21

## 2019-07-20 RX ORDER — AMMONIA INHALANTS 0.04 G/.3ML
0.3 INHALANT RESPIRATORY (INHALATION) AS NEEDED
Status: DISCONTINUED | OUTPATIENT
Start: 2019-07-20 | End: 2019-07-21

## 2019-07-20 RX ORDER — TERBUTALINE SULFATE 1 MG/ML
0.25 INJECTION, SOLUTION SUBCUTANEOUS AS NEEDED
Status: DISCONTINUED | OUTPATIENT
Start: 2019-07-20 | End: 2019-07-21

## 2019-07-21 LAB
ALBUMIN SERPL-MCNC: 2.7 G/DL (ref 3.4–5)
ALBUMIN/GLOB SERPL: 0.6 {RATIO} (ref 1–2)
ALP LIVER SERPL-CCNC: 98 U/L (ref 37–98)
ALT SERPL-CCNC: 31 U/L (ref 13–56)
ANION GAP SERPL CALC-SCNC: 9 MMOL/L (ref 0–18)
ANTIBODY SCREEN: NEGATIVE
AST SERPL-CCNC: 23 U/L (ref 15–37)
BILIRUB SERPL-MCNC: 0.4 MG/DL (ref 0.1–2)
BUN BLD-MCNC: 10 MG/DL (ref 7–18)
BUN/CREAT SERPL: 14.3 (ref 10–20)
CALCIUM BLD-MCNC: 9.4 MG/DL (ref 8.5–10.1)
CHLORIDE SERPL-SCNC: 108 MMOL/L (ref 98–112)
CO2 SERPL-SCNC: 23 MMOL/L (ref 21–32)
CREAT BLD-MCNC: 0.7 MG/DL (ref 0.55–1.02)
GLOBULIN PLAS-MCNC: 4.3 G/DL (ref 2.8–4.4)
GLUCOSE BLD-MCNC: 80 MG/DL (ref 70–99)
M PROTEIN MFR SERPL ELPH: 7 G/DL (ref 6.4–8.2)
OSMOLALITY SERPL CALC.SUM OF ELEC: 288 MOSM/KG (ref 275–295)
POTASSIUM SERPL-SCNC: 4 MMOL/L (ref 3.5–5.1)
RH BLOOD TYPE: POSITIVE
SODIUM SERPL-SCNC: 140 MMOL/L (ref 136–145)
T PALLIDUM AB SER QL IA: NONREACTIVE
URATE SERPL-MCNC: 5.2 MG/DL (ref 2.6–6)

## 2019-07-21 PROCEDURE — 59400 OBSTETRICAL CARE: CPT | Performed by: OBSTETRICS & GYNECOLOGY

## 2019-07-21 RX ORDER — METHYLERGONOVINE MALEATE 0.2 MG/ML
0.2 INJECTION INTRAVENOUS ONCE
Status: COMPLETED | OUTPATIENT
Start: 2019-07-21 | End: 2019-07-21

## 2019-07-21 RX ORDER — ZOLPIDEM TARTRATE 5 MG/1
5 TABLET ORAL NIGHTLY PRN
Status: DISCONTINUED | OUTPATIENT
Start: 2019-07-21 | End: 2019-07-23

## 2019-07-21 RX ORDER — NALBUPHINE HCL 10 MG/ML
2.5 AMPUL (ML) INJECTION
Status: DISCONTINUED | OUTPATIENT
Start: 2019-07-21 | End: 2019-07-21

## 2019-07-21 RX ORDER — METHYLERGONOVINE MALEATE 0.2 MG/ML
INJECTION INTRAVENOUS
Status: DISPENSED
Start: 2019-07-21 | End: 2019-07-21

## 2019-07-21 RX ORDER — ACETAMINOPHEN 325 MG/1
650 TABLET ORAL EVERY 6 HOURS PRN
Status: DISCONTINUED | OUTPATIENT
Start: 2019-07-21 | End: 2019-07-23

## 2019-07-21 RX ORDER — BISACODYL 10 MG
10 SUPPOSITORY, RECTAL RECTAL ONCE AS NEEDED
Status: ACTIVE | OUTPATIENT
Start: 2019-07-21 | End: 2019-07-21

## 2019-07-21 RX ORDER — EPHEDRINE SULFATE/0.9% NACL/PF 25 MG/5 ML
5 SYRINGE (ML) INTRAVENOUS AS NEEDED
Status: DISCONTINUED | OUTPATIENT
Start: 2019-07-21 | End: 2019-07-21

## 2019-07-21 RX ORDER — LABETALOL 200 MG/1
200 TABLET, FILM COATED ORAL EVERY 12 HOURS SCHEDULED
Status: DISCONTINUED | OUTPATIENT
Start: 2019-07-21 | End: 2019-07-21

## 2019-07-21 RX ORDER — LABETALOL 200 MG/1
200 TABLET, FILM COATED ORAL 2 TIMES DAILY
Status: DISCONTINUED | OUTPATIENT
Start: 2019-07-21 | End: 2019-07-23

## 2019-07-21 RX ORDER — SIMETHICONE 80 MG
80 TABLET,CHEWABLE ORAL 3 TIMES DAILY PRN
Status: DISCONTINUED | OUTPATIENT
Start: 2019-07-21 | End: 2019-07-23

## 2019-07-21 RX ORDER — IBUPROFEN 600 MG/1
600 TABLET ORAL EVERY 6 HOURS
Status: DISCONTINUED | OUTPATIENT
Start: 2019-07-21 | End: 2019-07-23

## 2019-07-21 RX ORDER — DOCUSATE SODIUM 100 MG/1
100 CAPSULE, LIQUID FILLED ORAL
Status: DISCONTINUED | OUTPATIENT
Start: 2019-07-21 | End: 2019-07-23

## 2019-07-21 NOTE — H&P
[facility] [employer]  History & Physical    Redge Aure Riggs Patient Status:  Inpatient    1985 MRN [id number]   Location [address] [facility] Attending Ya Humphrey MD    0 PCP Pablito Alvarado DO     CC: patient is here for labor    BOURGEOIS Grandfather    • Other (Other) Maternal Grandfather         myelodysplasic disease     Social History: Social History    Tobacco Use      Smoking status: Never Smoker      Smokeless tobacco: Never Used    Alcohol use: Not Currently      Alcohol/week: 0.0 s

## 2019-07-21 NOTE — PROGRESS NOTES
Pt is a  at 39 weeks here for r/o srom. Pt noticed a small trickle around 1600. Pt states she has been having some cramping. No vaginal bleeding. efm tested and applied.   at bedside

## 2019-07-21 NOTE — PROGRESS NOTES
NURSING ADMISSION NOTE      Patient admitted via Wheelchair  Oriented to room. Safety precautions initiated. Bed in low position. Call light in reach. Assessment completed. POC discussed with pt and spouse. Both verbalized understanding of POC.

## 2019-07-21 NOTE — PROGRESS NOTES
Pt is a 34year old female admitted to 105/-A. Patient presents with:  R/o Rom     Pt is  35w6d intra-uterine pregnancy. History obtained, consents signed. Oriented to room, staff, and plan of care.

## 2019-07-21 NOTE — PROGRESS NOTES
Patient ambulated to bathroom with assistance and voided without difficulty. Patient irena-care performed, mesh panties and pad applied, gown changed. Patient tolerated well.      Patient transferred to Mother Baby in stable condition via wheelchair accompan

## 2019-07-21 NOTE — L&D DELIVERY NOTE
Mk Riggs [[id number]]    Labor Events     labor?:  No   steroids?:  None  Antibiotics received during labor?:  Yes  Antibiotics (enter # doses in comment):  ampicillin  Rupture date/time:  2019 1600     Rupture type:  SROM skin with:   Mother     Vaginal Count    Initial count RN:  Abiodun Landa RN  Initial count Tech:  Shira Denis   Sharps    Initial counts 11   0    Final counts                   Vaginal Delivery Note          Mackenzie Every Villaier Patient

## 2019-07-22 LAB
BASOPHILS # BLD AUTO: 0.04 X10(3) UL (ref 0–0.2)
BASOPHILS NFR BLD AUTO: 0.4 %
DEPRECATED RDW RBC AUTO: 49.7 FL (ref 35.1–46.3)
EOSINOPHIL # BLD AUTO: 0.11 X10(3) UL (ref 0–0.7)
EOSINOPHIL NFR BLD AUTO: 1.2 %
ERYTHROCYTE [DISTWIDTH] IN BLOOD BY AUTOMATED COUNT: 14.6 % (ref 11–15)
HCT VFR BLD AUTO: 33.8 % (ref 35–48)
HGB BLD-MCNC: 11.1 G/DL (ref 12–16)
IMM GRANULOCYTES # BLD AUTO: 0.05 X10(3) UL (ref 0–1)
IMM GRANULOCYTES NFR BLD: 0.5 %
LYMPHOCYTES # BLD AUTO: 2.08 X10(3) UL (ref 1–4)
LYMPHOCYTES NFR BLD AUTO: 22.8 %
MCH RBC QN AUTO: 30.2 PG (ref 26–34)
MCHC RBC AUTO-ENTMCNC: 32.8 G/DL (ref 31–37)
MCV RBC AUTO: 91.8 FL (ref 80–100)
MONOCYTES # BLD AUTO: 0.55 X10(3) UL (ref 0.1–1)
MONOCYTES NFR BLD AUTO: 6 %
NEUTROPHILS # BLD AUTO: 6.3 X10 (3) UL (ref 1.5–7.7)
NEUTROPHILS # BLD AUTO: 6.3 X10(3) UL (ref 1.5–7.7)
NEUTROPHILS NFR BLD AUTO: 69.1 %
PLATELET # BLD AUTO: 232 10(3)UL (ref 150–450)
RBC # BLD AUTO: 3.68 X10(6)UL (ref 3.8–5.3)
WBC # BLD AUTO: 9.1 X10(3) UL (ref 4–11)

## 2019-07-22 NOTE — CM/SW NOTE
met with patient, Johanne Freitas in her patient room to review insurance and PCP for infant. Johanne Freitas stated that infant would be added to the insurance plan that she delivered under, Abner Nelson 150.  PCP will be her Family Medicine, Elzbieta Carlisle DO. MANNY ask

## 2019-07-23 VITALS
HEIGHT: 66 IN | DIASTOLIC BLOOD PRESSURE: 78 MMHG | BODY MASS INDEX: 42.91 KG/M2 | OXYGEN SATURATION: 97 % | WEIGHT: 267 LBS | SYSTOLIC BLOOD PRESSURE: 136 MMHG | RESPIRATION RATE: 17 BRPM | TEMPERATURE: 98 F | HEART RATE: 86 BPM

## 2019-07-23 RX ORDER — IBUPROFEN 600 MG/1
600 TABLET ORAL EVERY 6 HOURS
Qty: 40 TABLET | Refills: 0 | Status: SHIPPED | OUTPATIENT
Start: 2019-07-23 | End: 2019-07-29 | Stop reason: ALTCHOICE

## 2019-07-23 NOTE — CM/SW NOTE
07/23/19 1100   Referral Data   Referral Source Self referral   Referral Reason Counseling/support;Psychosocial assessment     SW met with pt to offer support due to the NICU admission of her baby boy. SW reviewed chart, and spoke with RN.      Pt bethel

## 2019-07-23 NOTE — DISCHARGE SUMMARY
[facility]  Discharge Summary    Lori Riggs Patient Status:  inpatient    1985 MRN [id number]   Location 9728/9964-E Attending EMG [address] 3 PCP Barrington Anderson DO     Date of Admission: 2019    Date of Discharge: 19

## 2019-07-23 NOTE — PROGRESS NOTES
Discharge patient home as order. Teaching complete, patient feel comfortable to take care herself, and baby in NICU. Discharge procedure complete at 1735, and patient going to NICU to visit baby.

## 2019-07-24 LAB
M TB TUBERC IFN-G BLD QL: NEGATIVE
M TB TUBERC IFN-G/MITOGEN IGNF BLD: 0 IU/ML
M TB TUBERC IFN-G/MITOGEN IGNF BLD: 0.01 IU/ML
M TB TUBERC IGNF/MITOGEN IGNF CONTROL: >10 IU/ML
MITOGEN IGNF BCKGRD COR BLD-ACNC: 0.01 IU/ML

## 2019-07-25 ENCOUNTER — TELEPHONE (OUTPATIENT)
Dept: OBGYN UNIT | Facility: HOSPITAL | Age: 34
End: 2019-07-25

## 2019-07-25 ENCOUNTER — PATIENT OUTREACH (OUTPATIENT)
Dept: FAMILY MEDICINE CLINIC | Facility: CLINIC | Age: 34
End: 2019-07-25

## 2019-07-25 NOTE — PROGRESS NOTES
Reviewed self care w / mom, she verbalizes understanding of instructions reviewed. Encourage to follow up w/ MDs as directed and w/ questions/concerns.  On bp meds, all care reviewed, all sx of PIH, enc to go to ct  Infant remains in nicu and pt visits isiah

## 2019-07-29 ENCOUNTER — TELEPHONE (OUTPATIENT)
Dept: OBGYN CLINIC | Facility: CLINIC | Age: 34
End: 2019-07-29

## 2019-07-29 ENCOUNTER — POSTPARTUM (OUTPATIENT)
Dept: OBGYN CLINIC | Facility: CLINIC | Age: 34
End: 2019-07-29
Payer: COMMERCIAL

## 2019-07-29 VITALS
HEIGHT: 67 IN | WEIGHT: 246 LBS | HEART RATE: 83 BPM | DIASTOLIC BLOOD PRESSURE: 80 MMHG | SYSTOLIC BLOOD PRESSURE: 138 MMHG | BODY MASS INDEX: 38.61 KG/M2

## 2019-07-29 DIAGNOSIS — O10.919 CHRONIC HYPERTENSION AFFECTING PREGNANCY: Primary | ICD-10-CM

## 2019-07-29 PROBLEM — O09.93 HIGH-RISK PREGNANCY IN THIRD TRIMESTER: Status: RESOLVED | Noted: 2019-01-16 | Resolved: 2019-07-21

## 2019-07-29 NOTE — TELEPHONE ENCOUNTER
Received in [facility] prior [facility].  [facility] [id number] 7/20-7/23 3 days

## 2019-07-31 RX ORDER — LABETALOL 200 MG/1
TABLET, FILM COATED ORAL
Qty: 60 TABLET | Refills: 0 | OUTPATIENT
Start: 2019-07-31

## 2019-08-01 ENCOUNTER — TELEPHONE (OUTPATIENT)
Dept: OBGYN CLINIC | Facility: CLINIC | Age: 34
End: 2019-08-01

## 2019-08-01 RX ORDER — LABETALOL 200 MG/1
200 TABLET, FILM COATED ORAL 2 TIMES DAILY
Qty: 60 TABLET | Refills: 5 | OUTPATIENT
Start: 2019-08-01

## 2019-08-01 RX ORDER — LABETALOL 200 MG/1
200 TABLET, FILM COATED ORAL 2 TIMES DAILY
Qty: 60 TABLET | Refills: 0 | Status: SHIPPED | OUTPATIENT
Start: 2019-08-01 | End: 2019-08-02

## 2019-08-01 NOTE — TELEPHONE ENCOUNTER
Pt discharged on 19 after ; taking Labetolol 200mg BID. Pt had PP appt on 19. Pt has another appt scheduled for 19. Pt requesting refill on Labetolol. Routed to Dr. Destinee Gonzales. Please advise if OK for refill.

## 2019-08-01 NOTE — TELEPHONE ENCOUNTER
Please call in script to [employer] [facility] on file, Dr. Pili Valencia told her to have Dr. Teena Rutherford do it. Call patient and let her know once this has been done.

## 2019-08-02 RX ORDER — LABETALOL 200 MG/1
200 TABLET, FILM COATED ORAL 2 TIMES DAILY
Qty: 180 TABLET | Refills: 0 | Status: SHIPPED | OUTPATIENT
Start: 2019-08-02 | End: 2019-10-27

## 2019-08-22 ENCOUNTER — POSTPARTUM (OUTPATIENT)
Dept: OBGYN CLINIC | Facility: CLINIC | Age: 34
End: 2019-08-22
Payer: COMMERCIAL

## 2019-08-22 VITALS
DIASTOLIC BLOOD PRESSURE: 82 MMHG | SYSTOLIC BLOOD PRESSURE: 130 MMHG | WEIGHT: 246 LBS | BODY MASS INDEX: 38.61 KG/M2 | HEIGHT: 67 IN

## 2019-08-22 PROBLEM — Z34.90 PREGNANCY: Status: RESOLVED | Noted: 2019-07-20 | Resolved: 2019-08-22

## 2019-08-22 PROBLEM — O16.3 HYPERTENSION AFFECTING PREGNANCY IN THIRD TRIMESTER: Status: RESOLVED | Noted: 2019-01-16 | Resolved: 2019-08-22

## 2019-08-22 RX ORDER — ACETAMINOPHEN AND CODEINE PHOSPHATE 120; 12 MG/5ML; MG/5ML
0.35 SOLUTION ORAL DAILY
Qty: 1 PACKAGE | Refills: 11 | Status: SHIPPED | OUTPATIENT
Start: 2019-08-22 | End: 2019-09-19

## 2019-08-22 NOTE — PROGRESS NOTES
Post Partum  No C/O, minimal bleeding  Prince amor doing well  Taking Labetalol  mg    ROS: No Cardiac, Respiratory, GI,  or Neurological symptoms.     PE:  Abdomen soft  Pelvic:External vag normal, cervix without lesions, uterus anterior, normal size,

## 2019-08-28 ENCOUNTER — TELEPHONE (OUTPATIENT)
Dept: OBGYN CLINIC | Facility: CLINIC | Age: 34
End: 2019-08-28

## 2019-08-28 NOTE — TELEPHONE ENCOUNTER
Patient has a Return to Work form that needs to be filled out. It is in the [place] office to be filled out.

## 2019-09-06 ENCOUNTER — MED REC SCAN ONLY (OUTPATIENT)
Dept: OBGYN CLINIC | Facility: CLINIC | Age: 34
End: 2019-09-06

## 2019-10-28 RX ORDER — LABETALOL 200 MG/1
200 TABLET, FILM COATED ORAL 2 TIMES DAILY
Qty: 60 TABLET | Refills: 0 | Status: SHIPPED | OUTPATIENT
Start: 2019-10-28 | End: 2019-12-02

## 2019-10-28 NOTE — TELEPHONE ENCOUNTER
Last OV: 8/22/19 with Dr. Abilio Lynn for post partum  Last refill date: 8/2/19  Follow-up: 6 mos  Next appt.: none scheduled; due 2/2020    Has appt with PCP on 11/4/19    Contacted patient.  She has appt with her PCP for an annual, but is going to run out of med

## 2019-10-31 ENCOUNTER — OFFICE VISIT (OUTPATIENT)
Dept: FAMILY MEDICINE CLINIC | Facility: CLINIC | Age: 34
End: 2019-10-31
Payer: COMMERCIAL

## 2019-10-31 VITALS
HEART RATE: 94 BPM | SYSTOLIC BLOOD PRESSURE: 128 MMHG | DIASTOLIC BLOOD PRESSURE: 82 MMHG | BODY MASS INDEX: 39 KG/M2 | OXYGEN SATURATION: 98 % | WEIGHT: 246 LBS | RESPIRATION RATE: 16 BRPM | TEMPERATURE: 100 F

## 2019-10-31 DIAGNOSIS — J22 LOWER RESPIRATORY INFECTION (E.G., BRONCHITIS, PNEUMONIA, PNEUMONITIS, PULMONITIS): ICD-10-CM

## 2019-10-31 DIAGNOSIS — R50.9 FEVER IN ADULT: ICD-10-CM

## 2019-10-31 DIAGNOSIS — J02.9 SORE THROAT: Primary | ICD-10-CM

## 2019-10-31 PROCEDURE — 87502 INFLUENZA DNA AMP PROBE: CPT | Performed by: PHYSICIAN ASSISTANT

## 2019-10-31 PROCEDURE — 87880 STREP A ASSAY W/OPTIC: CPT | Performed by: PHYSICIAN ASSISTANT

## 2019-10-31 PROCEDURE — 99213 OFFICE O/P EST LOW 20 MIN: CPT | Performed by: PHYSICIAN ASSISTANT

## 2019-10-31 RX ORDER — FLUTICASONE PROPIONATE 50 MCG
2 SPRAY, SUSPENSION (ML) NASAL DAILY
Qty: 1 INHALER | Refills: 0 | Status: SHIPPED | OUTPATIENT
Start: 2019-10-31 | End: 2020-07-27

## 2019-10-31 RX ORDER — AZITHROMYCIN 250 MG/1
TABLET, FILM COATED ORAL
Qty: 6 TABLET | Refills: 0 | Status: SHIPPED | OUTPATIENT
Start: 2019-10-31 | End: 2019-12-26 | Stop reason: ALTCHOICE

## 2019-10-31 NOTE — PROGRESS NOTES
CHIEF COMPLAINT:   Patient presents with:  Sore Throat: x 2 day  Cough: x 1 day. Nasal Congestion: body aches x today. HPI:   Halima Bah is a 34year old female who presents for URI symptoms for  About 48 hours.   Patient reports temps up to 9 /82 (BP Location: Left arm, Patient Position: Sitting, Cuff Size: adult)   Pulse 94   Temp 99.9 °F (37.7 °C) (Tympanic)   Resp 16   Wt 246 lb (111.6 kg)   SpO2 98%   BMI 38.53 kg/m²   GENERAL: well developed, well nourished,in no apparent distress  S Dionna Alba is a 34year old female who presents with symptoms that are consistent with    ASSESSMENT:   Sore throat  (primary encounter diagnosis)  Fever in adult  Lower respiratory infection (e.g., bronchitis, pneumonia, pneumonitis, pulmonitis) Gargling every hour or 2 can ease irritation.  Try gargling with 1 of these solutions:  · 1/4 teaspoon of salt in 1/2 cup of warm water  · An over-the-counter anesthetic gargle  Use medicine for more relief  Over-the-counter medicine can reduce sore throat

## 2019-10-31 NOTE — PATIENT INSTRUCTIONS
-Push fluids  -Cool mist humidifier  -Tea with honey  -Go to the ER with worsening symptoms  -Flonase        Self-Care for Sore Throats    Sore throats happen for many reasons, such as colds, allergies, and infections caused by viruses or bacteria.  In any spreading. · Don’t strain your vocal cords.   Contact your healthcare provider if you have:  · A temperature over 101°F (38.3°C)  · White spots on the throat  · Great difficulty swallowing  · Trouble breathing  · A skin rash  · Recent exposure to someone e

## 2019-11-01 ENCOUNTER — TELEPHONE (OUTPATIENT)
Dept: FAMILY MEDICINE CLINIC | Facility: CLINIC | Age: 34
End: 2019-11-01

## 2019-11-01 NOTE — TELEPHONE ENCOUNTER
Pt was seen at [facility], She was told she had a resp virus. She was given a z-felipe. She now has a fever. She has been taking tylenol for the fever. She is nursing and pumping. She would like to know if she can take ibuprofen  Every 4 hours too.

## 2019-11-01 NOTE — TELEPHONE ENCOUNTER
She can safely take motrin Ibuprofen Advil etc while breast feeding in fact It is the medication of choice for pain and fever control, 600 mg every 8 hours with some food

## 2019-11-04 ENCOUNTER — OFFICE VISIT (OUTPATIENT)
Dept: FAMILY MEDICINE CLINIC | Facility: CLINIC | Age: 34
End: 2019-11-04
Payer: COMMERCIAL

## 2019-11-04 VITALS
WEIGHT: 251.38 LBS | TEMPERATURE: 98 F | OXYGEN SATURATION: 98 % | RESPIRATION RATE: 20 BRPM | SYSTOLIC BLOOD PRESSURE: 112 MMHG | DIASTOLIC BLOOD PRESSURE: 66 MMHG | HEART RATE: 93 BPM | BODY MASS INDEX: 39 KG/M2

## 2019-11-04 DIAGNOSIS — I10 ESSENTIAL HYPERTENSION: Primary | ICD-10-CM

## 2019-11-04 DIAGNOSIS — J22 LOWER RESPIRATORY INFECTION (E.G., BRONCHITIS, PNEUMONIA, PNEUMONITIS, PULMONITIS): ICD-10-CM

## 2019-11-04 PROCEDURE — 99214 OFFICE O/P EST MOD 30 MIN: CPT | Performed by: FAMILY MEDICINE

## 2019-11-05 NOTE — PROGRESS NOTES
Dionna Alba is a 34year old female. HPI:   Patient presents for recheck of her hypertension. Pt has been taking medications as instructed, no medication side effects, home BP monitoring in the range not checked. Feeling well, no side effects. 10/10/12   • OTHER SURGICAL HISTORY  2010    wisdom teeth removed      Social History:    Social History    Tobacco Use      Smoking status: Never Smoker      Smokeless tobacco: Never Used    Alcohol use: Not Currently      Alcohol/week: 0.0 standard drink

## 2019-12-26 ENCOUNTER — OFFICE VISIT (OUTPATIENT)
Dept: FAMILY MEDICINE CLINIC | Facility: CLINIC | Age: 34
End: 2019-12-26
Payer: COMMERCIAL

## 2019-12-26 VITALS
DIASTOLIC BLOOD PRESSURE: 80 MMHG | TEMPERATURE: 100 F | RESPIRATION RATE: 18 BRPM | WEIGHT: 251 LBS | HEART RATE: 112 BPM | OXYGEN SATURATION: 98 % | SYSTOLIC BLOOD PRESSURE: 128 MMHG | BODY MASS INDEX: 39.39 KG/M2 | HEIGHT: 67 IN

## 2019-12-26 DIAGNOSIS — B34.9 VIRAL SYNDROME: Primary | ICD-10-CM

## 2019-12-26 DIAGNOSIS — J06.9 VIRAL URI: ICD-10-CM

## 2019-12-26 PROCEDURE — 99213 OFFICE O/P EST LOW 20 MIN: CPT | Performed by: NURSE PRACTITIONER

## 2019-12-26 PROCEDURE — 87502 INFLUENZA DNA AMP PROBE: CPT | Performed by: NURSE PRACTITIONER

## 2019-12-26 PROCEDURE — 87880 STREP A ASSAY W/OPTIC: CPT | Performed by: NURSE PRACTITIONER

## 2019-12-26 NOTE — PATIENT INSTRUCTIONS
-alternate tylenol and ibuprofen as needed  -stay well hydrated and rest  -humidifier overnight  -for cough: delsym or robitussin DM  -gargle with salt water  -follow up if new or worsening symptoms, or no improvement in next 4-5 days      Viral Syndrome ( orange juice; lemonade; apple, grape, and cranberry juice; clear fruit drinks; electrolyte replacement and sports drinks; and decaffeinated teas and coffee.  If you have been diagnosed with a kidney disease, ask your healthcare provider how much and what ty

## 2019-12-26 NOTE — PROGRESS NOTES
CHIEF COMPLAINT:   Patient presents with:  Sore Throat: losing voice, fever, bodyaches x 1 day       HPI:   Jonathan Ron is a 30year old female who presents for sudden onset of symptoms. Symptoms began yesterday.  Patient reports body aches, produc /80 (BP Location: Left arm, Patient Position: Sitting, Cuff Size: adult)   Pulse 112   Temp 99.5 °F (37.5 °C) (Oral)   Resp 18   Ht 67\"   Wt 251 lb (113.9 kg)   LMP 12/25/2019   SpO2 98%   Breastfeeding Yes   BMI 39.31 kg/m²   GENERAL: well develope Complications of influenza discussed. Including secondary infections like AOM, bronchitis, PNA, sinusitis. Patient needs to be rechecked if exhibiting any symptoms of these illnesses.        Meds & Refills for this Visit:  Requested Prescriptions      No p · Stay away from cigarette smoke - both your smoke and the smoke from others. · You may use over-the-counter acetaminophen or ibuprofen for fever, muscle aching, and headache, unless another medicine was prescribed for this.  If you have chronic liver or k · Continued vomiting (can’t keep liquids down)  · Frequent diarrhea (more than 5 times a day); blood (red or black color) or mucus in diarrhea  · Feeling weak, dizzy, or like you are going to faint  · Extreme thirst  · Fever of 100.4°F (38°C) or higher, or

## 2020-01-25 ENCOUNTER — OFFICE VISIT (OUTPATIENT)
Dept: FAMILY MEDICINE CLINIC | Facility: CLINIC | Age: 35
End: 2020-01-25
Payer: COMMERCIAL

## 2020-01-25 VITALS
DIASTOLIC BLOOD PRESSURE: 80 MMHG | SYSTOLIC BLOOD PRESSURE: 126 MMHG | HEART RATE: 112 BPM | RESPIRATION RATE: 18 BRPM | BODY MASS INDEX: 39 KG/M2 | OXYGEN SATURATION: 98 % | WEIGHT: 250.63 LBS | TEMPERATURE: 98 F

## 2020-01-25 DIAGNOSIS — H66.001 NON-RECURRENT ACUTE SUPPURATIVE OTITIS MEDIA OF RIGHT EAR WITHOUT SPONTANEOUS RUPTURE OF TYMPANIC MEMBRANE: Primary | ICD-10-CM

## 2020-01-25 PROCEDURE — 99214 OFFICE O/P EST MOD 30 MIN: CPT | Performed by: FAMILY MEDICINE

## 2020-01-25 RX ORDER — AMOXICILLIN AND CLAVULANATE POTASSIUM 875; 125 MG/1; MG/1
1 TABLET, FILM COATED ORAL 2 TIMES DAILY
Qty: 20 TABLET | Refills: 0 | Status: SHIPPED | OUTPATIENT
Start: 2020-01-25 | End: 2020-02-04

## 2020-01-25 RX ORDER — NORETHINDRONE
KIT
COMMUNITY
Start: 2019-11-11 | End: 2020-07-27

## 2020-01-25 NOTE — PROGRESS NOTES
Mik Posey is a 30year old female.  Patient presents with:  Ear Pain: per pt- right, congestion, cough    HPI:   Nadja Le presents to the office with complaints of upper respiratory tract infection, having congestion for a couple weeks on and off, • Other (Other) Sister         brain tumor   • Other (Other) Paternal Grandmother         taking a blood thinner   • Bipolar Disorder Sister    • Bipolar Disorder Sister    • Heart Disorder Maternal Grandfather    • Other (Other) Maternal Grandfather spontaneous rupture of tympanic membrane  (primary encounter diagnosis)    No orders of the defined types were placed in this encounter.       Meds & Refills for this Visit:  Requested Prescriptions     Signed Prescriptions Disp Refills   • Amoxicillin-Pot

## 2020-04-27 NOTE — TELEPHONE ENCOUNTER
Last OV: 8/22/19 with Dr. Leigh Hayes for postpartum  Last refill date: 8/22/19  Follow-up: 6 mos  Next appt.: none scheduled; due 2-3/2020    Patient overdue for annual. Please contact her to schedule appt at appropriate time due to Covid-19 and then return to R

## 2020-05-05 NOTE — TELEPHONE ENCOUNTER
PT interested in changing her prescription    Future Appointments   Date Time Provider Eusebio Merida   7/27/2020  4:00 PM YANIV Chavira [facility]     Please call PT to discuss if she can fill a different BC

## 2020-05-11 RX ORDER — DROSPIRENONE AND ETHINYL ESTRADIOL 0.03MG-3MG
1 KIT ORAL DAILY
Qty: 84 TABLET | Refills: 0 | Status: SHIPPED | OUTPATIENT
Start: 2020-05-11 | End: 2020-07-27

## 2020-05-11 RX ORDER — NORETHINDRONE
KIT
Qty: 28 TABLET | Refills: 0 | OUTPATIENT
Start: 2020-05-11

## 2020-05-11 NOTE — TELEPHONE ENCOUNTER
Patient notified of Monica's recommendations. Carissa ordered x 3 months. Pt has been taking minipill daily. Pt will take 2 Carissa today to start pack OR pt will continue on minipill until next Sunday and then start Carissa.    Patient verbalized understandin

## 2020-05-11 NOTE — TELEPHONE ENCOUNTER
OK for Carissa # 84 tablets no refills. OK to start today, she should double up today if she is normally a Sunday starter and has been taking her mini pill. If she hasn't been using anything she can just start today and use condoms for back- up for 1 month.

## 2020-05-11 NOTE — TELEPHONE ENCOUNTER
Pt last seen 8/22/19 for PP appt; prescribed POP at that time; was breastfeeding and pumping. Pt has appt scheduled for annual 7/27/20. Pt stopped pumping/nursing 6 months ago.   Pt would like to go back on Carissa; worked well in the past for her with no

## 2020-06-25 DIAGNOSIS — Z78.9 PARTICIPANT IN HEALTH AND WELLNESS PLAN: Primary | ICD-10-CM

## 2020-07-01 ENCOUNTER — NURSE ONLY (OUTPATIENT)
Dept: LAB | Age: 35
End: 2020-07-01
Attending: PREVENTIVE MEDICINE
Payer: COMMERCIAL

## 2020-07-27 ENCOUNTER — OFFICE VISIT (OUTPATIENT)
Dept: OBGYN CLINIC | Facility: CLINIC | Age: 35
End: 2020-07-27
Payer: COMMERCIAL

## 2020-07-27 VITALS
SYSTOLIC BLOOD PRESSURE: 134 MMHG | DIASTOLIC BLOOD PRESSURE: 70 MMHG | WEIGHT: 256.63 LBS | BODY MASS INDEX: 42.24 KG/M2 | HEIGHT: 65.35 IN | HEART RATE: 113 BPM

## 2020-07-27 DIAGNOSIS — Z12.4 CERVICAL CANCER SCREENING: ICD-10-CM

## 2020-07-27 DIAGNOSIS — Z01.419 WELL WOMAN EXAM WITH ROUTINE GYNECOLOGICAL EXAM: Primary | ICD-10-CM

## 2020-07-27 DIAGNOSIS — Z30.41 SURVEILLANCE FOR BIRTH CONTROL, ORAL CONTRACEPTIVES: ICD-10-CM

## 2020-07-27 PROCEDURE — 87624 HPV HI-RISK TYP POOLED RSLT: CPT | Performed by: NURSE PRACTITIONER

## 2020-07-27 PROCEDURE — 3008F BODY MASS INDEX DOCD: CPT | Performed by: NURSE PRACTITIONER

## 2020-07-27 PROCEDURE — 99395 PREV VISIT EST AGE 18-39: CPT | Performed by: NURSE PRACTITIONER

## 2020-07-27 PROCEDURE — 3075F SYST BP GE 130 - 139MM HG: CPT | Performed by: NURSE PRACTITIONER

## 2020-07-27 PROCEDURE — 3078F DIAST BP <80 MM HG: CPT | Performed by: NURSE PRACTITIONER

## 2020-07-27 RX ORDER — DROSPIRENONE AND ETHINYL ESTRADIOL 0.03MG-3MG
1 KIT ORAL DAILY
Qty: 84 TABLET | Refills: 3 | Status: SHIPPED | OUTPATIENT
Start: 2020-07-27

## 2020-07-27 NOTE — PROGRESS NOTES
Here for Routine Annual Exam  No concerns or questions. Menses are regular, no concerns. Contraception- OCP and doing well. No C/O, moving to [address] [employer] [place]    ROS: No Cardiac, Respiratory, GI,  or Neurological symptoms.     PE:  GENERAL:

## 2020-07-30 LAB — HPV I/H RISK 1 DNA SPEC QL NAA+PROBE: NEGATIVE

## 2020-10-30 ENCOUNTER — TELEPHONE (OUTPATIENT)
Dept: FAMILY MEDICINE CLINIC | Facility: CLINIC | Age: 35
End: 2020-10-30

## 2020-10-30 NOTE — TELEPHONE ENCOUNTER
Pt sister in law has Covid. Has not had contact with her.  Sister in law watches her child     Requesting a Covid

## 2020-11-02 NOTE — TELEPHONE ENCOUNTER
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 Advised since she has had no direct contact with sister in law no need to quarantine. If other members of household are positive will need to quarantine.    Advised she should still wear mask and soc

## 2021-07-24 ENCOUNTER — TELEPHONE (OUTPATIENT)
Dept: FAMILY MEDICINE CLINIC | Facility: CLINIC | Age: 36
End: 2021-07-24

## 2021-07-24 RX ORDER — ONDANSETRON 8 MG/1
8 TABLET, ORALLY DISINTEGRATING ORAL EVERY 8 HOURS PRN
Qty: 20 TABLET | Refills: 0 | Status: SHIPPED | OUTPATIENT
Start: 2021-07-24

## 2021-07-24 RX ORDER — ONDANSETRON 8 MG/1
8 TABLET, ORALLY DISINTEGRATING ORAL EVERY 8 HOURS PRN
Qty: 20 TABLET | Refills: 0 | Status: SHIPPED | OUTPATIENT
Start: 2021-07-24 | End: 2021-07-24

## 2021-07-24 NOTE — TELEPHONE ENCOUNTER
Patient called requesting a call back from nurse.     Pt states that she has the same symptoms as her son:    Nausea and diarreah    Please advise # [phone]

## 2021-07-24 NOTE — TELEPHONE ENCOUNTER
Spoke with pt- her son started with vomiting and diarreha a few nights ago. This little fernandez got this from - possibly noro virus? Pt states she is now having nausea and diarrhea.     PT is wondering if [facility] will callin some Zofran to help with nause

## 2021-07-24 NOTE — TELEPHONE ENCOUNTER
Spoke with patient about below and will fax script over to [facility] in [place] per patients request.

## 2021-07-24 NOTE — TELEPHONE ENCOUNTER
Yes, script sent for [place]. Immodium definitely has pros and cons. Some experts suggest using immodium during viral gastroenteritis can prolong the course of illness, that it's preferable to let the virus shed and exit the body.   If the diarrhea is sever

## 2021-09-21 DIAGNOSIS — I10 ESSENTIAL HYPERTENSION: ICD-10-CM

## 2021-09-21 RX ORDER — LABETALOL 300 MG/1
300 TABLET, FILM COATED ORAL 2 TIMES DAILY
Qty: 60 TABLET | Refills: 0 | Status: SHIPPED | OUTPATIENT
Start: 2021-09-21 | End: 2021-10-26

## 2021-10-26 DIAGNOSIS — I10 ESSENTIAL HYPERTENSION: ICD-10-CM

## 2021-10-26 RX ORDER — LABETALOL 300 MG/1
TABLET, FILM COATED ORAL
Qty: 180 TABLET | Refills: 0 | OUTPATIENT
Start: 2021-10-26

## 2021-10-26 RX ORDER — LABETALOL 300 MG/1
TABLET, FILM COATED ORAL
Qty: 60 TABLET | Refills: 0 | Status: SHIPPED | OUTPATIENT
Start: 2021-10-26

## 2021-10-26 NOTE — TELEPHONE ENCOUNTER
Hypertension Medications Protocol Failed 10/26/2021 08:50 AM   Protocol Details  CMP or BMP in past 12 months    Appointment in past 6 or next 3 months    Last serum creatinine< 2.0     Last OV 1-  Last lab 7-20-19   Last refilled 9/21/21  #60  0 re

## 2021-10-26 NOTE — TELEPHONE ENCOUNTER
Spoke with patient who states she is currently pregnant. Asking if she can get another month supply as she has an appt with OB in 2 weeks and will request OB continue filling as they are following her closely. Advised patient 1 month script will be sent.

## 2021-10-26 NOTE — TELEPHONE ENCOUNTER
Hypertension Medications Protocol Failed 10/26/2021 02:36 PM   Protocol Details  CMP or BMP in past 12 months    Appointment in past 6 or next 3 months    Last serum creatinine< 2.0        Last OV on 1 25 2020  Blood pressure 126/80   Last CMP 7 20 2019   No future appointments scheduled

## 2022-08-15 ENCOUNTER — TELEMEDICINE (OUTPATIENT)
Dept: FAMILY MEDICINE CLINIC | Facility: CLINIC | Age: 37
End: 2022-08-15
Payer: COMMERCIAL

## 2022-08-15 VITALS — SYSTOLIC BLOOD PRESSURE: 122 MMHG | DIASTOLIC BLOOD PRESSURE: 86 MMHG

## 2022-08-15 DIAGNOSIS — I10 ESSENTIAL HYPERTENSION: Primary | ICD-10-CM

## 2022-08-15 PROBLEM — D21.9 FIBROID: Status: ACTIVE | Noted: 2021-11-17

## 2022-08-15 RX ORDER — LISINOPRIL 10 MG/1
10 TABLET ORAL DAILY
Qty: 90 TABLET | Refills: 1 | Status: SHIPPED | OUTPATIENT
Start: 2022-08-15

## 2022-08-15 RX ORDER — ACETAMINOPHEN AND CODEINE PHOSPHATE 120; 12 MG/5ML; MG/5ML
0.35 SOLUTION ORAL DAILY
COMMUNITY
Start: 2022-06-20

## 2022-08-15 RX ORDER — LISINOPRIL 10 MG/1
10 TABLET ORAL DAILY
COMMUNITY
Start: 2022-06-20 | End: 2022-08-15

## 2022-09-22 NOTE — PROGRESS NOTES
Reason for Consult:   Dear Dr. Cooper Mendoza,    Thank you for requesting maternal fetal medicine consultation on [facility]. As you are aware she is a 34year old female with a Wu pregnancy at [address].   A maternal-fetal medicine consultation was Mother    • Thyroid Disorder Mother         hypothyroid   • Diabetes Maternal Grandmother    • Cancer Maternal Grandmother         colon   • Heart Disorder Maternal Grandmother    • Other (Other) Maternal Grandmother         copd/kidney disease   • Diabete mellitus. Data suggest that obese women should be encouraged to undertake a weight reduction program (diet, exercise, behavior modification, and possibly bariatric surgery in some cases) prior to attempting to conceive.             Subfertility in obese w and excessive maternal weight gain before or during pregnancy contribute to an increased probability of  delivery. It has also been hypothesized that obesity may lead to dystocia due to increased soft tissue deposition in the maternal pelvis.    Ce pregnancy loss. Conception by IVF is associated with an increased incidence of several obstetrical and  complications. Most of these are related to the high incidence of multiple gestations.   The precise reasons for this increase in advers none

## 2022-12-19 ENCOUNTER — PATIENT MESSAGE (OUTPATIENT)
Dept: FAMILY MEDICINE CLINIC | Facility: CLINIC | Age: 37
End: 2022-12-19

## 2022-12-19 ENCOUNTER — OFFICE VISIT (OUTPATIENT)
Dept: FAMILY MEDICINE CLINIC | Facility: CLINIC | Age: 37
End: 2022-12-19
Payer: COMMERCIAL

## 2022-12-19 VITALS
BODY MASS INDEX: 41.14 KG/M2 | TEMPERATURE: 98 F | RESPIRATION RATE: 16 BRPM | OXYGEN SATURATION: 99 % | HEART RATE: 96 BPM | HEIGHT: 66 IN | DIASTOLIC BLOOD PRESSURE: 100 MMHG | WEIGHT: 256 LBS | SYSTOLIC BLOOD PRESSURE: 142 MMHG

## 2022-12-19 DIAGNOSIS — E66.01 CLASS 3 SEVERE OBESITY DUE TO EXCESS CALORIES WITH SERIOUS COMORBIDITY AND BODY MASS INDEX (BMI) OF 40.0 TO 44.9 IN ADULT (HCC): ICD-10-CM

## 2022-12-19 DIAGNOSIS — R06.83 SNORING: Primary | ICD-10-CM

## 2022-12-19 DIAGNOSIS — Z00.00 HEALTHY ADULT ON ROUTINE PHYSICAL EXAMINATION: Primary | ICD-10-CM

## 2022-12-19 DIAGNOSIS — E66.01 MORBID OBESITY (HCC): ICD-10-CM

## 2022-12-19 DIAGNOSIS — R63.5 WEIGHT GAIN: ICD-10-CM

## 2022-12-19 DIAGNOSIS — I10 ESSENTIAL HYPERTENSION: ICD-10-CM

## 2022-12-19 DIAGNOSIS — R53.82 CHRONIC FATIGUE: ICD-10-CM

## 2022-12-19 DIAGNOSIS — E55.9 VITAMIN D DEFICIENCY: ICD-10-CM

## 2022-12-19 LAB
ALBUMIN SERPL-MCNC: 3.9 G/DL (ref 3.4–5)
ALBUMIN/GLOB SERPL: 1.3 {RATIO} (ref 1–2)
ALP LIVER SERPL-CCNC: 72 U/L
ALT SERPL-CCNC: 31 U/L
ANION GAP SERPL CALC-SCNC: 6 MMOL/L (ref 0–18)
AST SERPL-CCNC: 15 U/L (ref 15–37)
BASOPHILS # BLD AUTO: 0.04 X10(3) UL (ref 0–0.2)
BASOPHILS NFR BLD AUTO: 0.5 %
BILIRUB SERPL-MCNC: 0.4 MG/DL (ref 0.1–2)
BUN BLD-MCNC: 11 MG/DL (ref 7–18)
CALCIUM BLD-MCNC: 8.9 MG/DL (ref 8.5–10.1)
CHLORIDE SERPL-SCNC: 107 MMOL/L (ref 98–112)
CHOLEST SERPL-MCNC: 156 MG/DL (ref ?–200)
CO2 SERPL-SCNC: 26 MMOL/L (ref 21–32)
CREAT BLD-MCNC: 0.75 MG/DL
EOSINOPHIL # BLD AUTO: 0.14 X10(3) UL (ref 0–0.7)
EOSINOPHIL NFR BLD AUTO: 1.9 %
ERYTHROCYTE [DISTWIDTH] IN BLOOD BY AUTOMATED COUNT: 12.9 %
FASTING PATIENT LIPID ANSWER: YES
FASTING STATUS PATIENT QL REPORTED: YES
GFR SERPLBLD BASED ON 1.73 SQ M-ARVRAT: 106 ML/MIN/1.73M2 (ref 60–?)
GLOBULIN PLAS-MCNC: 3.1 G/DL (ref 2.8–4.4)
GLUCOSE BLD-MCNC: 105 MG/DL (ref 70–99)
HCT VFR BLD AUTO: 39.1 %
HDLC SERPL-MCNC: 30 MG/DL (ref 40–59)
HGB BLD-MCNC: 13.4 G/DL
IMM GRANULOCYTES # BLD AUTO: 0.02 X10(3) UL (ref 0–1)
IMM GRANULOCYTES NFR BLD: 0.3 %
LDLC SERPL CALC-MCNC: 100 MG/DL (ref ?–100)
LYMPHOCYTES # BLD AUTO: 1.94 X10(3) UL (ref 1–4)
LYMPHOCYTES NFR BLD AUTO: 26 %
MCH RBC QN AUTO: 31.5 PG (ref 26–34)
MCHC RBC AUTO-ENTMCNC: 34.3 G/DL (ref 31–37)
MCV RBC AUTO: 92 FL
MONOCYTES # BLD AUTO: 0.31 X10(3) UL (ref 0.1–1)
MONOCYTES NFR BLD AUTO: 4.2 %
NEUTROPHILS # BLD AUTO: 5.01 X10 (3) UL (ref 1.5–7.7)
NEUTROPHILS # BLD AUTO: 5.01 X10(3) UL (ref 1.5–7.7)
NEUTROPHILS NFR BLD AUTO: 67.1 %
NONHDLC SERPL-MCNC: 126 MG/DL (ref ?–130)
OSMOLALITY SERPL CALC.SUM OF ELEC: 288 MOSM/KG (ref 275–295)
PLATELET # BLD AUTO: 261 10(3)UL (ref 150–450)
POTASSIUM SERPL-SCNC: 4.5 MMOL/L (ref 3.5–5.1)
PROT SERPL-MCNC: 7 G/DL (ref 6.4–8.2)
RBC # BLD AUTO: 4.25 X10(6)UL
SODIUM SERPL-SCNC: 139 MMOL/L (ref 136–145)
TRIGL SERPL-MCNC: 143 MG/DL (ref 30–149)
TSI SER-ACNC: 1.02 MIU/ML (ref 0.36–3.74)
VIT D+METAB SERPL-MCNC: 15.9 NG/ML (ref 30–100)
VLDLC SERPL CALC-MCNC: 24 MG/DL (ref 0–30)
WBC # BLD AUTO: 7.5 X10(3) UL (ref 4–11)

## 2022-12-19 PROCEDURE — 80050 GENERAL HEALTH PANEL: CPT | Performed by: FAMILY MEDICINE

## 2022-12-19 PROCEDURE — 82306 VITAMIN D 25 HYDROXY: CPT | Performed by: FAMILY MEDICINE

## 2022-12-19 PROCEDURE — 80061 LIPID PANEL: CPT | Performed by: FAMILY MEDICINE

## 2022-12-19 RX ORDER — METFORMIN HYDROCHLORIDE 500 MG/1
500 TABLET, EXTENDED RELEASE ORAL 2 TIMES DAILY WITH MEALS
Qty: 180 TABLET | Refills: 0 | Status: SHIPPED | OUTPATIENT
Start: 2022-12-19

## 2022-12-19 RX ORDER — LISINOPRIL 20 MG/1
20 TABLET ORAL DAILY
Qty: 180 TABLET | Refills: 0 | Status: SHIPPED | OUTPATIENT
Start: 2022-12-19

## 2022-12-20 RX ORDER — TIRZEPATIDE 2.5 MG/.5ML
2.5 INJECTION, SOLUTION SUBCUTANEOUS
Qty: 2 ML | Refills: 0 | Status: SHIPPED | OUTPATIENT
Start: 2022-12-20

## 2022-12-20 RX ORDER — ERGOCALCIFEROL 1.25 MG/1
50000 CAPSULE ORAL WEEKLY
Qty: 12 CAPSULE | Refills: 0 | Status: SHIPPED | OUTPATIENT
Start: 2022-12-20 | End: 2023-03-08

## 2023-02-03 ENCOUNTER — PATIENT MESSAGE (OUTPATIENT)
Dept: FAMILY MEDICINE CLINIC | Facility: CLINIC | Age: 38
End: 2023-02-03

## 2023-02-03 DIAGNOSIS — E66.01 CLASS 3 SEVERE OBESITY DUE TO EXCESS CALORIES WITH SERIOUS COMORBIDITY AND BODY MASS INDEX (BMI) OF 40.0 TO 44.9 IN ADULT (HCC): Primary | ICD-10-CM

## 2023-02-04 RX ORDER — TIRZEPATIDE 7.5 MG/.5ML
7.5 INJECTION, SOLUTION SUBCUTANEOUS
Qty: 2 ML | Refills: 0 | Status: SHIPPED | OUTPATIENT
Start: 2023-02-04 | End: 2023-02-06 | Stop reason: ALTCHOICE

## 2023-03-05 DIAGNOSIS — E66.01 CLASS 3 SEVERE OBESITY DUE TO EXCESS CALORIES WITH SERIOUS COMORBIDITY AND BODY MASS INDEX (BMI) OF 40.0 TO 44.9 IN ADULT (HCC): ICD-10-CM

## 2023-03-06 RX ORDER — TIRZEPATIDE 7.5 MG/.5ML
INJECTION, SOLUTION SUBCUTANEOUS
Qty: 2 ML | Refills: 0 | OUTPATIENT
Start: 2023-03-06

## 2024-01-06 ENCOUNTER — OFFICE VISIT (OUTPATIENT)
Dept: FAMILY MEDICINE CLINIC | Facility: CLINIC | Age: 39
End: 2024-01-06
Payer: COMMERCIAL

## 2024-01-06 VITALS
HEART RATE: 83 BPM | OXYGEN SATURATION: 98 % | WEIGHT: 240 LBS | TEMPERATURE: 99 F | DIASTOLIC BLOOD PRESSURE: 82 MMHG | HEIGHT: 66 IN | SYSTOLIC BLOOD PRESSURE: 128 MMHG | RESPIRATION RATE: 16 BRPM | BODY MASS INDEX: 38.57 KG/M2

## 2024-01-06 DIAGNOSIS — I10 ESSENTIAL HYPERTENSION: ICD-10-CM

## 2024-01-06 DIAGNOSIS — E66.01 MORBID OBESITY (HCC): ICD-10-CM

## 2024-01-06 DIAGNOSIS — Z00.00 HEALTHY ADULT ON ROUTINE PHYSICAL EXAMINATION: Primary | ICD-10-CM

## 2024-01-06 DIAGNOSIS — E66.01 CLASS 3 SEVERE OBESITY DUE TO EXCESS CALORIES WITH SERIOUS COMORBIDITY AND BODY MASS INDEX (BMI) OF 40.0 TO 44.9 IN ADULT (HCC): ICD-10-CM

## 2024-01-06 DIAGNOSIS — E55.9 VITAMIN D DEFICIENCY: ICD-10-CM

## 2024-01-06 LAB
ALBUMIN SERPL-MCNC: 4.2 G/DL (ref 3.4–5)
ALBUMIN/GLOB SERPL: 1.2 {RATIO} (ref 1–2)
ALP LIVER SERPL-CCNC: 68 U/L
ALT SERPL-CCNC: 22 U/L
ANION GAP SERPL CALC-SCNC: 3 MMOL/L (ref 0–18)
AST SERPL-CCNC: 14 U/L (ref 15–37)
BASOPHILS # BLD AUTO: 0.03 X10(3) UL (ref 0–0.2)
BASOPHILS NFR BLD AUTO: 0.5 %
BILIRUB SERPL-MCNC: 0.6 MG/DL (ref 0.1–2)
BUN BLD-MCNC: 15 MG/DL (ref 9–23)
CALCIUM BLD-MCNC: 9 MG/DL (ref 8.5–10.1)
CHLORIDE SERPL-SCNC: 105 MMOL/L (ref 98–112)
CHOLEST SERPL-MCNC: 204 MG/DL (ref ?–200)
CO2 SERPL-SCNC: 29 MMOL/L (ref 21–32)
CREAT BLD-MCNC: 0.67 MG/DL
EGFRCR SERPLBLD CKD-EPI 2021: 116 ML/MIN/1.73M2 (ref 60–?)
EOSINOPHIL # BLD AUTO: 0.18 X10(3) UL (ref 0–0.7)
EOSINOPHIL NFR BLD AUTO: 2.9 %
ERYTHROCYTE [DISTWIDTH] IN BLOOD BY AUTOMATED COUNT: 13 %
EST. AVERAGE GLUCOSE BLD GHB EST-MCNC: 103 MG/DL (ref 68–126)
FASTING PATIENT LIPID ANSWER: YES
FASTING STATUS PATIENT QL REPORTED: YES
GLOBULIN PLAS-MCNC: 3.4 G/DL (ref 2.8–4.4)
GLUCOSE BLD-MCNC: 86 MG/DL (ref 70–99)
HBA1C MFR BLD: 5.2 % (ref ?–5.7)
HCT VFR BLD AUTO: 43.4 %
HDLC SERPL-MCNC: 39 MG/DL (ref 40–59)
HGB BLD-MCNC: 14.6 G/DL
IMM GRANULOCYTES # BLD AUTO: 0.01 X10(3) UL (ref 0–1)
IMM GRANULOCYTES NFR BLD: 0.2 %
LDLC SERPL CALC-MCNC: 139 MG/DL (ref ?–100)
LYMPHOCYTES # BLD AUTO: 2.18 X10(3) UL (ref 1–4)
LYMPHOCYTES NFR BLD AUTO: 34.8 %
MCH RBC QN AUTO: 30.8 PG (ref 26–34)
MCHC RBC AUTO-ENTMCNC: 33.6 G/DL (ref 31–37)
MCV RBC AUTO: 91.6 FL
MONOCYTES # BLD AUTO: 0.35 X10(3) UL (ref 0.1–1)
MONOCYTES NFR BLD AUTO: 5.6 %
NEUTROPHILS # BLD AUTO: 3.51 X10 (3) UL (ref 1.5–7.7)
NEUTROPHILS # BLD AUTO: 3.51 X10(3) UL (ref 1.5–7.7)
NEUTROPHILS NFR BLD AUTO: 56 %
NONHDLC SERPL-MCNC: 165 MG/DL (ref ?–130)
OSMOLALITY SERPL CALC.SUM OF ELEC: 284 MOSM/KG (ref 275–295)
PLATELET # BLD AUTO: 265 10(3)UL (ref 150–450)
POTASSIUM SERPL-SCNC: 4.5 MMOL/L (ref 3.5–5.1)
PROT SERPL-MCNC: 7.6 G/DL (ref 6.4–8.2)
RBC # BLD AUTO: 4.74 X10(6)UL
SODIUM SERPL-SCNC: 137 MMOL/L (ref 136–145)
TRIGL SERPL-MCNC: 145 MG/DL (ref 30–149)
TSI SER-ACNC: 1.4 MIU/ML (ref 0.36–3.74)
VIT D+METAB SERPL-MCNC: 17.7 NG/ML (ref 30–100)
VLDLC SERPL CALC-MCNC: 27 MG/DL (ref 0–30)
WBC # BLD AUTO: 6.3 X10(3) UL (ref 4–11)

## 2024-01-06 PROCEDURE — 84443 ASSAY THYROID STIM HORMONE: CPT | Performed by: FAMILY MEDICINE

## 2024-01-06 PROCEDURE — 83036 HEMOGLOBIN GLYCOSYLATED A1C: CPT | Performed by: FAMILY MEDICINE

## 2024-01-06 PROCEDURE — 80053 COMPREHEN METABOLIC PANEL: CPT | Performed by: FAMILY MEDICINE

## 2024-01-06 PROCEDURE — 3074F SYST BP LT 130 MM HG: CPT | Performed by: FAMILY MEDICINE

## 2024-01-06 PROCEDURE — 99395 PREV VISIT EST AGE 18-39: CPT | Performed by: FAMILY MEDICINE

## 2024-01-06 PROCEDURE — 82306 VITAMIN D 25 HYDROXY: CPT | Performed by: FAMILY MEDICINE

## 2024-01-06 PROCEDURE — 80061 LIPID PANEL: CPT | Performed by: FAMILY MEDICINE

## 2024-01-06 PROCEDURE — 3008F BODY MASS INDEX DOCD: CPT | Performed by: FAMILY MEDICINE

## 2024-01-06 PROCEDURE — 3079F DIAST BP 80-89 MM HG: CPT | Performed by: FAMILY MEDICINE

## 2024-01-06 PROCEDURE — 85025 COMPLETE CBC W/AUTO DIFF WBC: CPT | Performed by: FAMILY MEDICINE

## 2024-01-06 RX ORDER — LEVONORGESTREL 52 MG/1
1 INTRAUTERINE DEVICE INTRAUTERINE ONCE
COMMUNITY

## 2024-01-06 RX ORDER — METFORMIN HYDROCHLORIDE 500 MG/1
500 TABLET, EXTENDED RELEASE ORAL 2 TIMES DAILY WITH MEALS
Qty: 180 TABLET | Refills: 1 | Status: SHIPPED | OUTPATIENT
Start: 2024-01-06

## 2024-01-06 RX ORDER — LISINOPRIL 20 MG/1
20 TABLET ORAL DAILY
Qty: 180 TABLET | Refills: 1 | Status: SHIPPED | OUTPATIENT
Start: 2024-01-06

## 2024-01-06 NOTE — PROGRESS NOTES
HPI:   Nikkie Riggs is a 38 year old female who presents for a complete physical exam. Symptoms: denies discharge, itching, burning or dysuria, has IUD in place . Doesn't get periods. Patient complains of nothing new today.     Moving to [place] in 90 days. ([place] area). Parents are down there too.     Doing well with current meds.   Did great with mounjaro, but could no longer afford it.   Metformin is okay.     Would like vit D checked again. Felt better on rx dose.     Immunization History   Administered Date(s) Administered    Covid-19 Vaccine Pfizer 30 mcg/0.3 ml 02/13/2021, 03/06/2021, 11/26/2021    Covid-19 Vaccine Pfizer Bivalent 30mcg/0.3mL 09/18/2022    HPV (Gardasil) 01/21/2008    Influenza 10/04/2016, 11/17/2017, 10/11/2018, 10/18/2019, 10/19/2020, 09/18/2022    TDAP 03/31/2015, 05/30/2019, 02/28/2022     Wt Readings from Last 6 Encounters:   01/06/24 240 lb (108.9 kg)   12/19/22 256 lb (116.1 kg)   07/27/20 256 lb 9.6 oz (116.4 kg)   01/25/20 250 lb 9.6 oz (113.7 kg)   12/26/19 251 lb (113.9 kg)   11/04/19 251 lb 6.4 oz (114 kg)     Body mass index is 38.74 kg/m².     Lab Results   Component Value Date     (H) 12/19/2022    GLU 80 07/20/2019     (H) 03/16/2019     Lab Results   Component Value Date    CHOLEST 156 12/19/2022    CHOLEST 165 12/15/2017    CHOLEST 174 09/02/2016     Lab Results   Component Value Date    HDL 30 (L) 12/19/2022    HDL 35 (L) 12/15/2017    HDL 36 (L) 09/02/2016     Lab Results   Component Value Date     (H) 12/19/2022    LDL 92 12/15/2017     09/02/2016     Lab Results   Component Value Date    AST 15 12/19/2022    AST 23 07/20/2019    AST 12 (L) 03/16/2019     Lab Results   Component Value Date    ALT 31 12/19/2022    ALT 31 07/20/2019    ALT 16 03/16/2019       Current Outpatient Medications   Medication Sig Dispense Refill    Levonorgestrel (MIRENA, 52 MG,) 20 MCG/DAY Intrauterine IUD 1 Intra Uterine Device by Intrauterine route one time.       metFORMIN  MG Oral Tablet 24 Hr Take 1 tablet (500 mg total) by mouth 2 (two) times daily with meals. 180 tablet 1    lisinopril 20 MG Oral Tab Take 1 tablet (20 mg total) by mouth daily. 180 tablet 1      Past Medical History:   Diagnosis Date    Amenorrhea     Anxiety     Depression     anxiety, no meds during pregnancy    Essential hypertension       Past Surgical History:   Procedure Laterality Date    [facility] LAPBAND PROGRAM  10/10/12    OTHER SURGICAL HISTORY  2010    wisdom teeth removed      Family History   Problem Relation Age of Onset    Hypertension Father     High Cholesterol Father     Arthritis Father     Hypertension Mother     High Cholesterol Mother     Thyroid Disorder Mother         hypothyroid    Diabetes Maternal Grandmother     Cancer Maternal Grandmother         colon    Heart Disorder Maternal Grandmother     Other (Other) Maternal Grandmother         copd/kidney disease    Diabetes Paternal Grandfather     Other (Other) Sister         brain tumor    Other (Other) Paternal Grandmother         taking a blood thinner    Bipolar Disorder Sister     Bipolar Disorder Sister     Heart Disorder Maternal Grandfather     Other (Other) Maternal Grandfather         myelodysplasic disease      Social History:   Social History     Socioeconomic History    Marital status:    Tobacco Use    Smoking status: Never    Smokeless tobacco: Never   Vaping Use    Vaping Use: Never used   Substance and Sexual Activity    Alcohol use: Not Currently     Alcohol/week: 0.0 standard drinks of alcohol     Comment: twice a month    Drug use: No    Sexual activity: Yes     Partners: Male     Birth control/protection: OCP   Other Topics Concern    Caffeine Concern Yes     Comment: 1 cup coffee per day    Exercise No    Seat Belt Yes     Occ: insurance. : yes. Children: 2 young kids.   Exercise: minimal.  Diet: watches minimally     REVIEW OF SYSTEMS:   GENERAL: feels well otherwise  SKIN: denies any unusual  skin lesions  EYES:denies blurred vision or double vision  HEENT: denies nasal congestion, sinus pain or ST  LUNGS: denies shortness of breath with exertion  CARDIOVASCULAR: denies chest pain on exertion  GI: denies abdominal pain,denies heartburn  : denies dysuria, vaginal discharge or itching,periods light or absent   MUSCULOSKELETAL: denies back pain or joint pain   NEURO: denies headaches or dizziness   PSYCHE: denies depression or anxiety  HEMATOLOGIC: denies hx of anemia  ENDOCRINE: denies thyroid history  ALL/ASTHMA: denies hx of allergy or asthma    EXAM:   /82   Pulse 83   Temp 98.5 °F (36.9 °C) (Temporal)   Resp 16   Ht 5' 6\" (1.676 m)   Wt 240 lb (108.9 kg)   SpO2 98%   Breastfeeding No   BMI 38.74 kg/m²   Body mass index is 38.74 kg/m².   GENERAL: well developed, well nourished,in no apparent distress  SKIN: no rashes,no suspicious lesions  HEENT: atraumatic, normocephalic,ears and throat are clear  EYES:PERRLA, EOMI, conjunctiva are clear  NECK: supple,no adenopathy   CHEST: no chest tenderness  BREAST: not done   LUNGS: clear to auscultation  CARDIO: RRR without murmur  GI: good BS's,no masses, HSM or tenderness  : not done, deferred to gyne   MUSCULOSKELETAL: back is not tender,FROM of the back  EXTREMITIES: no cyanosis, clubbing or edema  NEURO: Oriented times three,cranial nerves are intact,motor and sensory are grossly intact    ASSESSMENT AND PLAN:   Nikkie Riggs is a 38 year old female who presents for a complete physical exam.  Pap and pelvic done. Not due for mammogram and dexascan. Self breast exam explained. Health maintenance, will check fasting Lipids, CMP, and CBC, TSH, Vit D. Pt not due for screening colonoscopy. Pt info handouts given for: exercise, low fat diet and breast self-exam. Pt' s weight is Body mass index is 38.74 kg/m²., recommended low fat diet and aerobic exercise 30 minutes three times weekly.  The patient indicates understanding of these issues and  agrees to the plan.  The patient is asked to return for CPX in 1 yr or sooner if needed.     Encounter Diagnoses   Name Primary?    Healthy adult on routine physical examination Yes    Vitamin D deficiency     Morbid obesity (HCC)     Class 3 severe obesity due to excess calories with serious comorbidity and body mass index (BMI) of 40.0 to 44.9 in adult (HCC)     Essential hypertension    - doing well. Continue current meds.   - refills sent. Advised to establish with PCP in [place].     Orders Placed This Encounter   Procedures    CBC With Differential With Platelet    Comp Metabolic Panel (14)    Hemoglobin A1C    Lipid Panel    TSH W Reflex To Free T4    Vitamin D    Vitamin D, 25-Hydroxy    VENIPUNCTURE       Meds & Refills for this Visit:  Requested Prescriptions     Signed Prescriptions Disp Refills    metFORMIN  MG Oral Tablet 24 Hr 180 tablet 1     Sig: Take 1 tablet (500 mg total) by mouth 2 (two) times daily with meals.    lisinopril 20 MG Oral Tab 180 tablet 1     Sig: Take 1 tablet (20 mg total) by mouth daily.       Imaging & Consults:  None

## (undated) NOTE — LETTER
Date: 12/26/2019    Patient Name: Chepachet Marshal          To Whom it may concern: The above patient was seen at the [facility] for treatment of a medical condition. This patient should be excused from attending work 12/27/19.

## (undated) NOTE — MR AVS SNAPSHOT
[address] [facility]  [address] [facility] [address], [place] [address] [address]  [phone]               Thank you for choosing us for your health care visit with YANIV Preciado.   We are glad to serve you and happy to provide you with this sum These medications were sent to [address], [address] [place] [address],   [address], [phone], [address], [address]     Phone:  [phone]    - MedroxyPROGESTERone Acetate 10 MG Ta

## (undated) NOTE — MR AVS SNAPSHOT
[address] [facility] [address]  [phone]               Thank you for choosing us for your [facility] care visit [facility].   We are glad to serve you and happy to provide you with this Fully enjoy your food when eating. Don’t eat while distracted and slow down. Avoid over sized portions. Don’t eat while when you’re bored.      EAT THESE FOODS MORE OFTEN: EAT THESE FOODS LESS OFTEN:   Make half your plate fruits and vegetables Highly

## (undated) NOTE — LETTER
Date: 10/31/2019    Patient Name: Johnson Lopez          To Whom it may concern: The above patient was seen at the [facility] for treatment of a medical condition. Please excuse patient from work 11/1/19.          Sincerely,    Yaneth Ramirez

## (undated) NOTE — MR AVS SNAPSHOT
After Visit Summary   7/27/2020    Halima Bah    MRN: [id number]           Visit Information     Date & Time  7/27/2020  4:00 PM Provider  YANIV Abrams Department  [facility] [facility]  [facility]  Phone  [phone] EAT THESE FOODS MORE OFTEN: EAT THESE FOODS LESS OFTEN:   Make half your plate fruits and vegetables Highly refined, white starches including white bread, rice and pasta   Eat plenty of protein, keep the fat content low Sugars:  sodas and sports drinks, ca Visit face-to-face with a [facility] physician or   [facility] using your mobile device or computer   using [address] with a [facility] Physician or [facility] online. The physician will respond and provide   a treatment plan within a few hours.  ONLINE VISIT